# Patient Record
Sex: MALE | Race: WHITE | NOT HISPANIC OR LATINO | Employment: OTHER | URBAN - NONMETROPOLITAN AREA
[De-identification: names, ages, dates, MRNs, and addresses within clinical notes are randomized per-mention and may not be internally consistent; named-entity substitution may affect disease eponyms.]

---

## 2019-01-01 ENCOUNTER — READMISSION MANAGEMENT (OUTPATIENT)
Dept: CALL CENTER | Facility: HOSPITAL | Age: 54
End: 2019-01-01

## 2019-01-01 ENCOUNTER — APPOINTMENT (OUTPATIENT)
Dept: CT IMAGING | Facility: HOSPITAL | Age: 54
End: 2019-01-01

## 2019-01-01 ENCOUNTER — TELEPHONE (OUTPATIENT)
Dept: ONCOLOGY | Facility: CLINIC | Age: 54
End: 2019-01-01

## 2019-01-01 ENCOUNTER — HOSPITAL ENCOUNTER (INPATIENT)
Facility: HOSPITAL | Age: 54
LOS: 2 days | Discharge: HOME | End: 2019-08-16
Attending: EMERGENCY MEDICINE | Admitting: HOSPITALIST

## 2019-01-01 ENCOUNTER — APPOINTMENT (OUTPATIENT)
Dept: MRI IMAGING | Facility: HOSPITAL | Age: 54
End: 2019-01-01

## 2019-01-01 ENCOUNTER — HOSPITAL ENCOUNTER (EMERGENCY)
Facility: HOSPITAL | Age: 54
End: 2019-08-26
Attending: EMERGENCY MEDICINE | Admitting: EMERGENCY MEDICINE

## 2019-01-01 ENCOUNTER — APPOINTMENT (OUTPATIENT)
Dept: NUCLEAR MEDICINE | Facility: HOSPITAL | Age: 54
End: 2019-01-01

## 2019-01-01 VITALS
HEART RATE: 70 BPM | TEMPERATURE: 97 F | BODY MASS INDEX: 37.92 KG/M2 | SYSTOLIC BLOOD PRESSURE: 116 MMHG | WEIGHT: 256 LBS | RESPIRATION RATE: 22 BRPM | HEIGHT: 69 IN | DIASTOLIC BLOOD PRESSURE: 42 MMHG | OXYGEN SATURATION: 93 %

## 2019-01-01 VITALS
RESPIRATION RATE: 16 BRPM | OXYGEN SATURATION: 96 % | TEMPERATURE: 97.6 F | DIASTOLIC BLOOD PRESSURE: 91 MMHG | SYSTOLIC BLOOD PRESSURE: 153 MMHG | HEART RATE: 93 BPM | WEIGHT: 250 LBS | BODY MASS INDEX: 37.03 KG/M2 | HEIGHT: 69 IN

## 2019-01-01 DIAGNOSIS — F11.10 HEROIN ABUSE (HCC): ICD-10-CM

## 2019-01-01 DIAGNOSIS — C22.0 HEPATOCELLULAR CARCINOMA (HCC): Primary | ICD-10-CM

## 2019-01-01 DIAGNOSIS — A41.9 SEPSIS, DUE TO UNSPECIFIED ORGANISM: ICD-10-CM

## 2019-01-01 DIAGNOSIS — E87.5 HYPERKALEMIA: ICD-10-CM

## 2019-01-01 DIAGNOSIS — B17.9 ACUTE HEPATITIS: Primary | ICD-10-CM

## 2019-01-01 DIAGNOSIS — K76.7 HEPATORENAL SYNDROME (HCC): ICD-10-CM

## 2019-01-01 DIAGNOSIS — R17 JAUNDICE: ICD-10-CM

## 2019-01-01 LAB
ALBUMIN SERPL-MCNC: 2.9 G/DL (ref 3.5–5.2)
ALBUMIN SERPL-MCNC: 3.1 G/DL (ref 3.5–5.2)
ALBUMIN SERPL-MCNC: 3.4 G/DL (ref 3.5–5.2)
ALBUMIN/GLOB SERPL: 0.7 G/DL
ALBUMIN/GLOB SERPL: 0.8 G/DL
ALBUMIN/GLOB SERPL: 0.8 G/DL
ALP SERPL-CCNC: 152 U/L (ref 39–117)
ALP SERPL-CCNC: 173 U/L (ref 39–117)
ALP SERPL-CCNC: 217 U/L (ref 39–117)
ALPHA-FETOPROTEIN: ABNORMAL NG/ML (ref 0–8.3)
ALT SERPL W P-5'-P-CCNC: 171 U/L (ref 1–41)
ALT SERPL W P-5'-P-CCNC: 47 U/L (ref 1–41)
ALT SERPL W P-5'-P-CCNC: 54 U/L (ref 1–41)
ANION GAP SERPL CALCULATED.3IONS-SCNC: 15.4 MMOL/L (ref 5–15)
ANION GAP SERPL CALCULATED.3IONS-SCNC: 16.5 MMOL/L (ref 5–15)
ANION GAP SERPL CALCULATED.3IONS-SCNC: 38.6 MMOL/L (ref 5–15)
ANISOCYTOSIS BLD QL: ABNORMAL
APAP SERPL-MCNC: <5 MCG/ML (ref 10–30)
APTT PPP: 70 SECONDS (ref 22.7–35.4)
AST SERPL-CCNC: 148 U/L (ref 1–40)
AST SERPL-CCNC: 172 U/L (ref 1–40)
AST SERPL-CCNC: 697 U/L (ref 1–40)
BACTERIA UR QL AUTO: NORMAL /HPF
BASOPHILS # BLD AUTO: 0.06 10*3/MM3 (ref 0–0.2)
BASOPHILS NFR BLD AUTO: 0.7 % (ref 0–1.5)
BILIRUB SERPL-MCNC: 26 MG/DL (ref 0.2–1.2)
BILIRUB SERPL-MCNC: 9.2 MG/DL (ref 0.2–1.2)
BILIRUB SERPL-MCNC: 9.6 MG/DL (ref 0.2–1.2)
BILIRUB UR QL STRIP: ABNORMAL
BUN BLD-MCNC: 8 MG/DL (ref 6–20)
BUN BLD-MCNC: 8 MG/DL (ref 6–20)
BUN BLD-MCNC: 86 MG/DL (ref 6–20)
BUN/CREAT SERPL: 25.2 (ref 7–25)
BUN/CREAT SERPL: 9.1 (ref 7–25)
BUN/CREAT SERPL: 9.2 (ref 7–25)
CALCIUM SPEC-SCNC: 10 MG/DL (ref 8.6–10.5)
CALCIUM SPEC-SCNC: 9.4 MG/DL (ref 8.6–10.5)
CALCIUM SPEC-SCNC: 9.4 MG/DL (ref 8.6–10.5)
CHLORIDE SERPL-SCNC: 75 MMOL/L (ref 98–107)
CHLORIDE SERPL-SCNC: 92 MMOL/L (ref 98–107)
CHLORIDE SERPL-SCNC: 97 MMOL/L (ref 98–107)
CLARITY UR: CLEAR
CO2 SERPL-SCNC: 21.5 MMOL/L (ref 22–29)
CO2 SERPL-SCNC: 21.6 MMOL/L (ref 22–29)
CO2 SERPL-SCNC: 7.4 MMOL/L (ref 22–29)
COLOR UR: ABNORMAL
CREAT BLD-MCNC: 0.87 MG/DL (ref 0.76–1.27)
CREAT BLD-MCNC: 0.88 MG/DL (ref 0.76–1.27)
CREAT BLD-MCNC: 3.41 MG/DL (ref 0.76–1.27)
CYTO UR: NORMAL
D-LACTATE SERPL-SCNC: 21.1 MMOL/L (ref 0.5–2)
DEPRECATED RDW RBC AUTO: 51.3 FL (ref 37–54)
DEPRECATED RDW RBC AUTO: 63.4 FL (ref 37–54)
EOSINOPHIL # BLD AUTO: 0.13 10*3/MM3 (ref 0–0.4)
EOSINOPHIL # BLD MANUAL: 0.49 10*3/MM3 (ref 0–0.4)
EOSINOPHIL NFR BLD AUTO: 1.6 % (ref 0.3–6.2)
EOSINOPHIL NFR BLD MANUAL: 2 % (ref 0.3–6.2)
ERYTHROCYTE [DISTWIDTH] IN BLOOD BY AUTOMATED COUNT: 19.8 % (ref 12.3–15.4)
ERYTHROCYTE [DISTWIDTH] IN BLOOD BY AUTOMATED COUNT: 24.7 % (ref 12.3–15.4)
ETHANOL BLD-MCNC: <10 MG/DL (ref 0–10)
ETHANOL UR QL: <0.01 %
GFR SERPL CREATININE-BSD FRML MDRD: 19 ML/MIN/1.73
GFR SERPL CREATININE-BSD FRML MDRD: 90 ML/MIN/1.73
GFR SERPL CREATININE-BSD FRML MDRD: 91 ML/MIN/1.73
GLOBULIN UR ELPH-MCNC: 3.8 GM/DL
GLOBULIN UR ELPH-MCNC: 3.9 GM/DL
GLOBULIN UR ELPH-MCNC: 4.1 GM/DL
GLUCOSE BLD-MCNC: 103 MG/DL (ref 65–99)
GLUCOSE BLD-MCNC: 138 MG/DL (ref 65–99)
GLUCOSE BLD-MCNC: 94 MG/DL (ref 65–99)
GLUCOSE BLDC GLUCOMTR-MCNC: 101 MG/DL (ref 70–130)
GLUCOSE BLDC GLUCOMTR-MCNC: 116 MG/DL (ref 70–130)
GLUCOSE BLDC GLUCOMTR-MCNC: 120 MG/DL (ref 70–130)
GLUCOSE BLDC GLUCOMTR-MCNC: 120 MG/DL (ref 70–130)
GLUCOSE BLDC GLUCOMTR-MCNC: 124 MG/DL (ref 70–130)
GLUCOSE BLDC GLUCOMTR-MCNC: 182 MG/DL (ref 70–130)
GLUCOSE BLDC GLUCOMTR-MCNC: 92 MG/DL (ref 70–130)
GLUCOSE UR STRIP-MCNC: NEGATIVE MG/DL
HAV IGM SERPL QL IA: ABNORMAL
HBA1C MFR BLD: 11.43 % (ref 4.8–5.6)
HBV CORE IGM SERPL QL IA: ABNORMAL
HBV SURFACE AG SERPL QL IA: NORMAL
HBV SURFACE AG SERPL QL IA: REACTIVE
HBV SURFACE AG SERPL QL NT: POSITIVE
HCT VFR BLD AUTO: 46.3 % (ref 37.5–51)
HCT VFR BLD AUTO: 50.7 % (ref 37.5–51)
HCV AB SER DONR QL: REACTIVE
HCV RNA SERPL NAA+PROBE-ACNC: NORMAL IU/ML
HGB BLD-MCNC: 14.1 G/DL (ref 13–17.7)
HGB BLD-MCNC: 14.1 G/DL (ref 13–17.7)
HGB UR QL STRIP.AUTO: NEGATIVE
HOLD SPECIMEN: NORMAL
HYALINE CASTS UR QL AUTO: NORMAL /LPF
IMM GRANULOCYTES # BLD AUTO: 0.16 10*3/MM3 (ref 0–0.05)
IMM GRANULOCYTES NFR BLD AUTO: 1.9 % (ref 0–0.5)
INR PPP: 1.16 (ref 0.9–1.1)
INR PPP: 9.44 (ref 0.9–1.1)
KETONES UR QL STRIP: NEGATIVE
LAB AP CASE REPORT: NORMAL
LAB AP CLINICAL INFORMATION: NORMAL
LAB AP DIAGNOSIS COMMENT: NORMAL
LEUKOCYTE ESTERASE UR QL STRIP.AUTO: ABNORMAL
LIPASE SERPL-CCNC: 8 U/L (ref 13–60)
LYMPHOCYTES # BLD AUTO: 1.41 10*3/MM3 (ref 0.7–3.1)
LYMPHOCYTES # BLD MANUAL: 3.68 10*3/MM3 (ref 0.7–3.1)
LYMPHOCYTES NFR BLD AUTO: 16.8 % (ref 19.6–45.3)
LYMPHOCYTES NFR BLD MANUAL: 15 % (ref 19.6–45.3)
LYMPHOCYTES NFR BLD MANUAL: 6 % (ref 5–12)
MAGNESIUM SERPL-MCNC: 4.2 MG/DL (ref 1.6–2.6)
MCH RBC QN AUTO: 22.1 PG (ref 26.6–33)
MCH RBC QN AUTO: 24.1 PG (ref 26.6–33)
MCHC RBC AUTO-ENTMCNC: 27.8 G/DL (ref 31.5–35.7)
MCHC RBC AUTO-ENTMCNC: 30.5 G/DL (ref 31.5–35.7)
MCV RBC AUTO: 79.3 FL (ref 79–97)
MCV RBC AUTO: 79.3 FL (ref 79–97)
METAMYELOCYTES NFR BLD MANUAL: 7 % (ref 0–0)
MONOCYTES # BLD AUTO: 0.72 10*3/MM3 (ref 0.1–0.9)
MONOCYTES # BLD AUTO: 1.47 10*3/MM3 (ref 0.1–0.9)
MONOCYTES NFR BLD AUTO: 8.6 % (ref 5–12)
MYELOCYTES NFR BLD MANUAL: 3 % (ref 0–0)
NEUTROPHILS # BLD AUTO: 16.44 10*3/MM3 (ref 1.7–7)
NEUTROPHILS # BLD AUTO: 5.9 10*3/MM3 (ref 1.7–7)
NEUTROPHILS NFR BLD AUTO: 70.4 % (ref 42.7–76)
NEUTROPHILS NFR BLD MANUAL: 67 % (ref 42.7–76)
NITRITE UR QL STRIP: POSITIVE
NRBC BLD AUTO-RTO: 0.7 /100 WBC (ref 0–0.2)
NRBC BLD AUTO-RTO: 5.6 /100 WBC (ref 0–0.2)
NRBC SPEC MANUAL: 11 /100 WBC (ref 0–0.2)
PATH REPORT.FINAL DX SPEC: NORMAL
PATH REPORT.GROSS SPEC: NORMAL
PH UR STRIP.AUTO: 5.5 [PH] (ref 5–8)
PLAT MORPH BLD: NORMAL
PLATELET # BLD AUTO: 327 10*3/MM3 (ref 140–450)
PLATELET # BLD AUTO: 364 10*3/MM3 (ref 140–450)
PMV BLD AUTO: 12.2 FL (ref 6–12)
POTASSIUM BLD-SCNC: 3.9 MMOL/L (ref 3.5–5.2)
POTASSIUM BLD-SCNC: 4.2 MMOL/L (ref 3.5–5.2)
POTASSIUM BLD-SCNC: 7.1 MMOL/L (ref 3.5–5.2)
PROT SERPL-MCNC: 6.8 G/DL (ref 6–8.5)
PROT SERPL-MCNC: 6.9 G/DL (ref 6–8.5)
PROT SERPL-MCNC: 7.5 G/DL (ref 6–8.5)
PROT UR QL STRIP: ABNORMAL
PROTHROMBIN TIME: 14.5 SECONDS (ref 11.7–14.2)
PROTHROMBIN TIME: 76.7 SECONDS (ref 11.7–14.2)
RBC # BLD AUTO: 5.84 10*6/MM3 (ref 4.14–5.8)
RBC # BLD AUTO: 6.39 10*6/MM3 (ref 4.14–5.8)
RBC # UR: NORMAL /HPF
REF LAB TEST METHOD: NORMAL
SCHISTOCYTES BLD QL SMEAR: ABNORMAL
SODIUM BLD-SCNC: 121 MMOL/L (ref 136–145)
SODIUM BLD-SCNC: 130 MMOL/L (ref 136–145)
SODIUM BLD-SCNC: 134 MMOL/L (ref 136–145)
SP GR UR STRIP: 1.02 (ref 1–1.03)
SQUAMOUS #/AREA URNS HPF: NORMAL /HPF
TARGETS BLD QL SMEAR: ABNORMAL
TEST INFORMATION: NORMAL
UROBILINOGEN UR QL STRIP: ABNORMAL
WBC MORPH BLD: NORMAL
WBC NRBC COR # BLD: 24.54 10*3/MM3 (ref 3.4–10.8)
WBC NRBC COR # BLD: 8.38 10*3/MM3 (ref 3.4–10.8)
WBC UR QL AUTO: NORMAL /HPF
WHOLE BLOOD HOLD SPECIMEN: NORMAL
WHOLE BLOOD HOLD SPECIMEN: NORMAL

## 2019-01-01 PROCEDURE — 82105 ALPHA-FETOPROTEIN SERUM: CPT | Performed by: NURSE PRACTITIONER

## 2019-01-01 PROCEDURE — 82962 GLUCOSE BLOOD TEST: CPT

## 2019-01-01 PROCEDURE — 85610 PROTHROMBIN TIME: CPT | Performed by: EMERGENCY MEDICINE

## 2019-01-01 PROCEDURE — 25010000003 LIDOCAINE 1 % SOLUTION: Performed by: RADIOLOGY

## 2019-01-01 PROCEDURE — A9577 INJ MULTIHANCE: HCPCS | Performed by: HOSPITALIST

## 2019-01-01 PROCEDURE — 99284 EMERGENCY DEPT VISIT MOD MDM: CPT

## 2019-01-01 PROCEDURE — 85025 COMPLETE CBC W/AUTO DIFF WBC: CPT | Performed by: EMERGENCY MEDICINE

## 2019-01-01 PROCEDURE — 99233 SBSQ HOSP IP/OBS HIGH 50: CPT | Performed by: INTERNAL MEDICINE

## 2019-01-01 PROCEDURE — 99285 EMERGENCY DEPT VISIT HI MDM: CPT

## 2019-01-01 PROCEDURE — 87522 HEPATITIS C REVRS TRNSCRPJ: CPT | Performed by: INTERNAL MEDICINE

## 2019-01-01 PROCEDURE — 96375 TX/PRO/DX INJ NEW DRUG ADDON: CPT

## 2019-01-01 PROCEDURE — 88307 TISSUE EXAM BY PATHOLOGIST: CPT | Performed by: INTERNAL MEDICINE

## 2019-01-01 PROCEDURE — 25010000002 ONDANSETRON PER 1 MG: Performed by: EMERGENCY MEDICINE

## 2019-01-01 PROCEDURE — 25010000002 IOPAMIDOL 61 % SOLUTION: Performed by: EMERGENCY MEDICINE

## 2019-01-01 PROCEDURE — 80053 COMPREHEN METABOLIC PANEL: CPT | Performed by: NURSE PRACTITIONER

## 2019-01-01 PROCEDURE — 80307 DRUG TEST PRSMV CHEM ANLYZR: CPT | Performed by: EMERGENCY MEDICINE

## 2019-01-01 PROCEDURE — 83036 HEMOGLOBIN GLYCOSYLATED A1C: CPT | Performed by: NURSE PRACTITIONER

## 2019-01-01 PROCEDURE — 80053 COMPREHEN METABOLIC PANEL: CPT | Performed by: EMERGENCY MEDICINE

## 2019-01-01 PROCEDURE — 25010000002 HYDROMORPHONE PER 4 MG: Performed by: HOSPITALIST

## 2019-01-01 PROCEDURE — 96361 HYDRATE IV INFUSION ADD-ON: CPT

## 2019-01-01 PROCEDURE — 0 GADOBENATE DIMEGLUMINE 529 MG/ML SOLUTION: Performed by: HOSPITALIST

## 2019-01-01 PROCEDURE — 0 TECHNETIUM MEDRONATE KIT: Performed by: INTERNAL MEDICINE

## 2019-01-01 PROCEDURE — 83690 ASSAY OF LIPASE: CPT | Performed by: EMERGENCY MEDICINE

## 2019-01-01 PROCEDURE — 85730 THROMBOPLASTIN TIME PARTIAL: CPT | Performed by: EMERGENCY MEDICINE

## 2019-01-01 PROCEDURE — 25010000002 PIPERACILLIN SOD-TAZOBACTAM PER 1 G: Performed by: EMERGENCY MEDICINE

## 2019-01-01 PROCEDURE — 80074 ACUTE HEPATITIS PANEL: CPT | Performed by: EMERGENCY MEDICINE

## 2019-01-01 PROCEDURE — 88313 SPECIAL STAINS GROUP 2: CPT | Performed by: INTERNAL MEDICINE

## 2019-01-01 PROCEDURE — 0FB03ZX EXCISION OF LIVER, PERCUTANEOUS APPROACH, DIAGNOSTIC: ICD-10-PCS | Performed by: RADIOLOGY

## 2019-01-01 PROCEDURE — 83735 ASSAY OF MAGNESIUM: CPT | Performed by: EMERGENCY MEDICINE

## 2019-01-01 PROCEDURE — 25010000002 LORAZEPAM PER 2 MG: Performed by: EMERGENCY MEDICINE

## 2019-01-01 PROCEDURE — 99255 IP/OBS CONSLTJ NEW/EST HI 80: CPT | Performed by: INTERNAL MEDICINE

## 2019-01-01 PROCEDURE — A9503 TC99M MEDRONATE: HCPCS | Performed by: INTERNAL MEDICINE

## 2019-01-01 PROCEDURE — 77012 CT SCAN FOR NEEDLE BIOPSY: CPT

## 2019-01-01 PROCEDURE — 25010000002 HYDROMORPHONE PER 4 MG: Performed by: NURSE PRACTITIONER

## 2019-01-01 PROCEDURE — 25010000002 HYDROMORPHONE PER 4 MG: Performed by: EMERGENCY MEDICINE

## 2019-01-01 PROCEDURE — 71250 CT THORAX DX C-: CPT

## 2019-01-01 PROCEDURE — 85007 BL SMEAR W/DIFF WBC COUNT: CPT | Performed by: EMERGENCY MEDICINE

## 2019-01-01 PROCEDURE — 74177 CT ABD & PELVIS W/CONTRAST: CPT

## 2019-01-01 PROCEDURE — 96365 THER/PROPH/DIAG IV INF INIT: CPT

## 2019-01-01 PROCEDURE — 88342 IMHCHEM/IMCYTCHM 1ST ANTB: CPT | Performed by: INTERNAL MEDICINE

## 2019-01-01 PROCEDURE — 25010000002 HYDROMORPHONE PER 4 MG: Performed by: INTERNAL MEDICINE

## 2019-01-01 PROCEDURE — 83605 ASSAY OF LACTIC ACID: CPT | Performed by: EMERGENCY MEDICINE

## 2019-01-01 PROCEDURE — 63710000001 INSULIN GLARGINE PER 5 UNITS: Performed by: NURSE PRACTITIONER

## 2019-01-01 PROCEDURE — 63710000001 INSULIN LISPRO (HUMAN) PER 5 UNITS: Performed by: NURSE PRACTITIONER

## 2019-01-01 PROCEDURE — 74183 MRI ABD W/O CNTR FLWD CNTR: CPT

## 2019-01-01 PROCEDURE — 87341 HEP B SURFACE AG NEUTRLZJ IA: CPT | Performed by: NURSE PRACTITIONER

## 2019-01-01 PROCEDURE — 81001 URINALYSIS AUTO W/SCOPE: CPT | Performed by: EMERGENCY MEDICINE

## 2019-01-01 PROCEDURE — 88341 IMHCHEM/IMCYTCHM EA ADD ANTB: CPT | Performed by: INTERNAL MEDICINE

## 2019-01-01 RX ORDER — HYDROMORPHONE HYDROCHLORIDE 1 MG/ML
0.5 INJECTION, SOLUTION INTRAMUSCULAR; INTRAVENOUS; SUBCUTANEOUS
Status: DISCONTINUED | OUTPATIENT
Start: 2019-01-01 | End: 2019-01-01

## 2019-01-01 RX ORDER — SODIUM CHLORIDE 0.9 % (FLUSH) 0.9 %
10 SYRINGE (ML) INJECTION EVERY 12 HOURS SCHEDULED
Status: CANCELLED | OUTPATIENT
Start: 2019-01-01

## 2019-01-01 RX ORDER — OXYCODONE HYDROCHLORIDE 15 MG/1
30 TABLET ORAL EVERY 6 HOURS PRN
Status: DISCONTINUED | OUTPATIENT
Start: 2019-01-01 | End: 2019-01-01 | Stop reason: HOSPADM

## 2019-01-01 RX ORDER — LISINOPRIL 20 MG/1
20 TABLET ORAL DAILY
Status: DISCONTINUED | OUTPATIENT
Start: 2019-01-01 | End: 2019-01-01 | Stop reason: HOSPADM

## 2019-01-01 RX ORDER — CHOLECALCIFEROL (VITAMIN D3) 125 MCG
5 CAPSULE ORAL NIGHTLY PRN
Status: DISCONTINUED | OUTPATIENT
Start: 2019-01-01 | End: 2019-01-01 | Stop reason: HOSPADM

## 2019-01-01 RX ORDER — ONDANSETRON 2 MG/ML
4 INJECTION INTRAMUSCULAR; INTRAVENOUS ONCE
Status: COMPLETED | OUTPATIENT
Start: 2019-01-01 | End: 2019-01-01

## 2019-01-01 RX ORDER — OXYCODONE HYDROCHLORIDE 15 MG/1
30 TABLET ORAL EVERY 6 HOURS PRN
Qty: 240 TABLET | Refills: 0 | Status: CANCELLED | OUTPATIENT
Start: 2019-01-01

## 2019-01-01 RX ORDER — SODIUM CHLORIDE 0.9 % (FLUSH) 0.9 %
3 SYRINGE (ML) INJECTION EVERY 12 HOURS SCHEDULED
Status: DISCONTINUED | OUTPATIENT
Start: 2019-01-01 | End: 2019-01-01 | Stop reason: HOSPADM

## 2019-01-01 RX ORDER — LISINOPRIL 20 MG/1
20 TABLET ORAL DAILY
COMMUNITY

## 2019-01-01 RX ORDER — OXYCODONE HYDROCHLORIDE 15 MG/1
30 TABLET ORAL EVERY 6 HOURS PRN
Qty: 240 TABLET | Refills: 0
Start: 2019-01-01 | End: 2019-01-01

## 2019-01-01 RX ORDER — METAXALONE 800 MG/1
800 TABLET ORAL 3 TIMES DAILY PRN
Status: DISCONTINUED | OUTPATIENT
Start: 2019-01-01 | End: 2019-01-01 | Stop reason: HOSPADM

## 2019-01-01 RX ORDER — LIDOCAINE HYDROCHLORIDE 10 MG/ML
20 INJECTION, SOLUTION INFILTRATION; PERINEURAL ONCE
Status: COMPLETED | OUTPATIENT
Start: 2019-01-01 | End: 2019-01-01

## 2019-01-01 RX ORDER — SODIUM CHLORIDE 0.9 % (FLUSH) 0.9 %
3-10 SYRINGE (ML) INJECTION AS NEEDED
Status: DISCONTINUED | OUTPATIENT
Start: 2019-01-01 | End: 2019-01-01 | Stop reason: HOSPADM

## 2019-01-01 RX ORDER — HYDROMORPHONE HYDROCHLORIDE 1 MG/ML
0.5 INJECTION, SOLUTION INTRAMUSCULAR; INTRAVENOUS; SUBCUTANEOUS ONCE
Status: COMPLETED | OUTPATIENT
Start: 2019-01-01 | End: 2019-01-01

## 2019-01-01 RX ORDER — INSULIN GLARGINE 100 [IU]/ML
10 INJECTION, SOLUTION SUBCUTANEOUS NIGHTLY
Status: DISCONTINUED | OUTPATIENT
Start: 2019-01-01 | End: 2019-01-01 | Stop reason: HOSPADM

## 2019-01-01 RX ORDER — SODIUM CHLORIDE 0.9 % (FLUSH) 0.9 %
20 SYRINGE (ML) INJECTION AS NEEDED
Status: CANCELLED | OUTPATIENT
Start: 2019-01-01

## 2019-01-01 RX ORDER — HYDROMORPHONE HYDROCHLORIDE 1 MG/ML
1 INJECTION, SOLUTION INTRAMUSCULAR; INTRAVENOUS; SUBCUTANEOUS
Status: DISCONTINUED | OUTPATIENT
Start: 2019-01-01 | End: 2019-01-01

## 2019-01-01 RX ORDER — HYDROCODONE BITARTRATE AND ACETAMINOPHEN 5; 325 MG/1; MG/1
1 TABLET ORAL ONCE AS NEEDED
Status: COMPLETED | OUTPATIENT
Start: 2019-01-01 | End: 2019-01-01

## 2019-01-01 RX ORDER — SODIUM CHLORIDE 0.9 % (FLUSH) 0.9 %
10 SYRINGE (ML) INJECTION AS NEEDED
Status: CANCELLED | OUTPATIENT
Start: 2019-01-01

## 2019-01-01 RX ORDER — HYDROMORPHONE HCL IN 0.9% NACL 10 MG/50ML
PATIENT CONTROLLED ANALGESIA SYRINGE INTRAVENOUS CONTINUOUS
Status: DISCONTINUED | OUTPATIENT
Start: 2019-01-01 | End: 2019-01-01

## 2019-01-01 RX ORDER — NALOXONE HCL 0.4 MG/ML
0.1 VIAL (ML) INJECTION
Status: DISCONTINUED | OUTPATIENT
Start: 2019-01-01 | End: 2019-01-01 | Stop reason: HOSPADM

## 2019-01-01 RX ORDER — SODIUM CHLORIDE 0.9 % (FLUSH) 0.9 %
10 SYRINGE (ML) INJECTION AS NEEDED
Status: DISCONTINUED | OUTPATIENT
Start: 2019-01-01 | End: 2019-01-01 | Stop reason: HOSPADM

## 2019-01-01 RX ORDER — ALUMINA, MAGNESIA, AND SIMETHICONE 2400; 2400; 240 MG/30ML; MG/30ML; MG/30ML
15 SUSPENSION ORAL EVERY 6 HOURS PRN
Status: DISCONTINUED | OUTPATIENT
Start: 2019-01-01 | End: 2019-01-01 | Stop reason: HOSPADM

## 2019-01-01 RX ORDER — DEXTROSE MONOHYDRATE 25 G/50ML
25 INJECTION, SOLUTION INTRAVENOUS
Status: DISCONTINUED | OUTPATIENT
Start: 2019-01-01 | End: 2019-01-01 | Stop reason: HOSPADM

## 2019-01-01 RX ORDER — OXYCODONE HYDROCHLORIDE 15 MG/1
30 TABLET ORAL EVERY 6 HOURS PRN
Qty: 120 TABLET | Refills: 0 | Status: SHIPPED | OUTPATIENT
Start: 2019-01-01

## 2019-01-01 RX ORDER — ONDANSETRON 2 MG/ML
4 INJECTION INTRAMUSCULAR; INTRAVENOUS EVERY 6 HOURS PRN
Status: DISCONTINUED | OUTPATIENT
Start: 2019-01-01 | End: 2019-01-01 | Stop reason: HOSPADM

## 2019-01-01 RX ORDER — HYDROMORPHONE HYDROCHLORIDE 1 MG/ML
2 INJECTION, SOLUTION INTRAMUSCULAR; INTRAVENOUS; SUBCUTANEOUS
Status: DISCONTINUED | OUTPATIENT
Start: 2019-01-01 | End: 2019-01-01

## 2019-01-01 RX ORDER — NICOTINE POLACRILEX 4 MG
15 LOZENGE BUCCAL
Status: DISCONTINUED | OUTPATIENT
Start: 2019-01-01 | End: 2019-01-01 | Stop reason: HOSPADM

## 2019-01-01 RX ORDER — TC 99M MEDRONATE 20 MG/10ML
21.2 INJECTION, POWDER, LYOPHILIZED, FOR SOLUTION INTRAVENOUS
Status: COMPLETED | OUTPATIENT
Start: 2019-01-01 | End: 2019-01-01

## 2019-01-01 RX ORDER — HYDROMORPHONE HCL IN 0.9% NACL 10 MG/50ML
PATIENT CONTROLLED ANALGESIA SYRINGE INTRAVENOUS CONTINUOUS
Status: DISCONTINUED | OUTPATIENT
Start: 2019-01-01 | End: 2019-01-01 | Stop reason: HOSPADM

## 2019-01-01 RX ORDER — SODIUM CHLORIDE 9 MG/ML
125 INJECTION, SOLUTION INTRAVENOUS CONTINUOUS
Status: DISCONTINUED | OUTPATIENT
Start: 2019-01-01 | End: 2019-01-01 | Stop reason: HOSPADM

## 2019-01-01 RX ORDER — LORAZEPAM 2 MG/ML
1 INJECTION INTRAMUSCULAR ONCE
Status: COMPLETED | OUTPATIENT
Start: 2019-01-01 | End: 2019-01-01

## 2019-01-01 RX ORDER — LIDOCAINE 50 MG/G
2 PATCH TOPICAL
Status: DISCONTINUED | OUTPATIENT
Start: 2019-01-01 | End: 2019-01-01 | Stop reason: HOSPADM

## 2019-01-01 RX ADMIN — METAXALONE 800 MG: 800 TABLET ORAL at 05:31

## 2019-01-01 RX ADMIN — HYDROMORPHONE HYDROCHLORIDE 1 MG: 1 INJECTION, SOLUTION INTRAMUSCULAR; INTRAVENOUS; SUBCUTANEOUS at 23:19

## 2019-01-01 RX ADMIN — HYDROMORPHONE HYDROCHLORIDE 1 MG: 1 INJECTION, SOLUTION INTRAMUSCULAR; INTRAVENOUS; SUBCUTANEOUS at 20:19

## 2019-01-01 RX ADMIN — IOPAMIDOL 85 ML: 612 INJECTION, SOLUTION INTRAVENOUS at 09:29

## 2019-01-01 RX ADMIN — SODIUM CHLORIDE, PRESERVATIVE FREE 3 ML: 5 INJECTION INTRAVENOUS at 21:30

## 2019-01-01 RX ADMIN — LIDOCAINE 2 PATCH: 50 PATCH TOPICAL at 08:32

## 2019-01-01 RX ADMIN — HYDROMORPHONE HYDROCHLORIDE 0.5 MG: 1 INJECTION, SOLUTION INTRAMUSCULAR; INTRAVENOUS; SUBCUTANEOUS at 13:28

## 2019-01-01 RX ADMIN — ONDANSETRON 4 MG: 2 INJECTION INTRAMUSCULAR; INTRAVENOUS at 13:28

## 2019-01-01 RX ADMIN — HYDROMORPHONE HYDROCHLORIDE: 10 INJECTION INTRAMUSCULAR; INTRAVENOUS; SUBCUTANEOUS at 08:53

## 2019-01-01 RX ADMIN — HYDROMORPHONE HYDROCHLORIDE: 10 INJECTION INTRAMUSCULAR; INTRAVENOUS; SUBCUTANEOUS at 22:10

## 2019-01-01 RX ADMIN — TAZOBACTAM SODIUM AND PIPERACILLIN SODIUM 4.5 G: 500; 4 INJECTION, SOLUTION INTRAVENOUS at 13:54

## 2019-01-01 RX ADMIN — LISINOPRIL 20 MG: 20 TABLET ORAL at 08:20

## 2019-01-01 RX ADMIN — HYDROMORPHONE HYDROCHLORIDE 0.5 MG: 1 INJECTION, SOLUTION INTRAMUSCULAR; INTRAVENOUS; SUBCUTANEOUS at 11:46

## 2019-01-01 RX ADMIN — HYDROMORPHONE HYDROCHLORIDE 1 MG: 1 INJECTION, SOLUTION INTRAMUSCULAR; INTRAVENOUS; SUBCUTANEOUS at 02:35

## 2019-01-01 RX ADMIN — GADOBENATE DIMEGLUMINE 20 ML: 529 INJECTION, SOLUTION INTRAVENOUS at 15:23

## 2019-01-01 RX ADMIN — HYDROMORPHONE HYDROCHLORIDE 0.5 MG: 1 INJECTION, SOLUTION INTRAMUSCULAR; INTRAVENOUS; SUBCUTANEOUS at 17:39

## 2019-01-01 RX ADMIN — LORAZEPAM 1 MG: 2 INJECTION INTRAMUSCULAR; INTRAVENOUS at 13:39

## 2019-01-01 RX ADMIN — SODIUM CHLORIDE, PRESERVATIVE FREE 3 ML: 5 INJECTION INTRAVENOUS at 08:00

## 2019-01-01 RX ADMIN — LIDOCAINE HYDROCHLORIDE 20 ML: 10 INJECTION, SOLUTION INFILTRATION; PERINEURAL at 11:15

## 2019-01-01 RX ADMIN — METAXALONE 800 MG: 800 TABLET ORAL at 13:30

## 2019-01-01 RX ADMIN — HYDROMORPHONE HYDROCHLORIDE 0.5 MG: 1 INJECTION, SOLUTION INTRAMUSCULAR; INTRAVENOUS; SUBCUTANEOUS at 13:39

## 2019-01-01 RX ADMIN — INSULIN LISPRO 2 UNITS: 100 INJECTION, SOLUTION INTRAVENOUS; SUBCUTANEOUS at 17:39

## 2019-01-01 RX ADMIN — SODIUM CHLORIDE, PRESERVATIVE FREE 3 ML: 5 INJECTION INTRAVENOUS at 22:27

## 2019-01-01 RX ADMIN — HYDROMORPHONE HYDROCHLORIDE 1 MG: 1 INJECTION, SOLUTION INTRAMUSCULAR; INTRAVENOUS; SUBCUTANEOUS at 05:37

## 2019-01-01 RX ADMIN — METAXALONE 800 MG: 800 TABLET ORAL at 21:37

## 2019-01-01 RX ADMIN — SODIUM CHLORIDE, PRESERVATIVE FREE 3 ML: 5 INJECTION INTRAVENOUS at 08:33

## 2019-01-01 RX ADMIN — ALUMINUM HYDROXIDE, MAGNESIUM HYDROXIDE, AND DIMETHICONE 15 ML: 400; 400; 40 SUSPENSION ORAL at 16:41

## 2019-01-01 RX ADMIN — HYDROMORPHONE HYDROCHLORIDE 2 MG: 1 INJECTION, SOLUTION INTRAMUSCULAR; INTRAVENOUS; SUBCUTANEOUS at 07:58

## 2019-01-01 RX ADMIN — ONDANSETRON 4 MG: 2 INJECTION INTRAMUSCULAR; INTRAVENOUS at 11:46

## 2019-01-01 RX ADMIN — SODIUM CHLORIDE 125 ML/HR: 9 INJECTION, SOLUTION INTRAVENOUS at 13:24

## 2019-01-01 RX ADMIN — SODIUM CHLORIDE 1000 ML: 9 INJECTION, SOLUTION INTRAVENOUS at 08:27

## 2019-01-01 RX ADMIN — HYDROCODONE BITARTRATE AND ACETAMINOPHEN 1 TABLET: 5; 325 TABLET ORAL at 05:31

## 2019-01-01 RX ADMIN — LISINOPRIL 20 MG: 20 TABLET ORAL at 08:32

## 2019-01-01 RX ADMIN — Medication 21.2 MILLICURIE: at 06:30

## 2019-01-01 RX ADMIN — HYDROMORPHONE HYDROCHLORIDE 0.5 MG: 1 INJECTION, SOLUTION INTRAMUSCULAR; INTRAVENOUS; SUBCUTANEOUS at 14:22

## 2019-01-01 RX ADMIN — OXYCODONE HYDROCHLORIDE 30 MG: 15 TABLET ORAL at 11:40

## 2019-01-01 RX ADMIN — LIDOCAINE 2 PATCH: 50 PATCH TOPICAL at 13:30

## 2019-01-01 RX ADMIN — INSULIN GLARGINE 10 UNITS: 100 INJECTION, SOLUTION SUBCUTANEOUS at 21:18

## 2019-08-14 PROBLEM — B19.20 HEPATITIS C: Status: ACTIVE | Noted: 2019-01-01

## 2019-08-14 PROBLEM — B17.9 ACUTE HEPATITIS: Status: ACTIVE | Noted: 2019-01-01

## 2019-08-14 PROBLEM — I10 HYPERTENSION: Status: ACTIVE | Noted: 2019-01-01

## 2019-08-14 PROBLEM — E11.9 DIABETES MELLITUS (HCC): Status: ACTIVE | Noted: 2019-01-01

## 2019-08-14 PROBLEM — B19.10 HEPATITIS B: Status: ACTIVE | Noted: 2019-01-01

## 2019-08-14 NOTE — ED PROVIDER NOTES
" EMERGENCY DEPARTMENT ENCOUNTER    CHIEF COMPLAINT  Chief Complaint: Abdominal Pain   History given by: Patient   History limited by: none   Room Number: 02/02  PMD: German Monahan MD      HPI:  Pt is a 54 y.o. male who presents complaining of epigastric and RUQ abd pain for the past 2 weeks. Pt confirms jaundice over the last few days, as well as nausea, vomiting, \"yellow\" diarrhea, and increased concentration of urine. Per pt, he has not seen or discussed case with primary care. Pt states he has hx of hepatitis B and C, which he was told was resolved with medications. Pt affirms he was diagnosed with \"mass\" in abd 6 months ago at [facility], but is unable to specify any further details. Pt denies hx of abdominal surgeries, hx of EtOH usage, hx of smoking, or any overuse of Tylenol.     Duration:  2 weeks   Onset: gradual   Timing: constant  Location: epigastric and RUQ  Radiation: none   Quality: pain   Intensity/Severity: moderate   Progression: unchanged   Associated Symptoms: jaundice, nausea, vomiting, diarrhea, concentrated urine   Aggravating Factors: none   Alleviating Factors: none   Previous Episodes: none  Treatment before arrival: none     PAST MEDICAL HISTORY  Active Ambulatory Problems     Diagnosis Date Noted   • No Active Ambulatory Problems     Resolved Ambulatory Problems     Diagnosis Date Noted   • No Resolved Ambulatory Problems     Past Medical History:   Diagnosis Date   • Diabetes mellitus (CMS/HCC)    • Hepatitis B    • Hepatitis C    • Hyperlipidemia    • Hypertension    • Myocardial infarction (CMS/HCC) 2014   • Substance abuse (CMS/HCC)        PAST SURGICAL HISTORY  Past Surgical History:   Procedure Laterality Date   • CORONARY ANGIOPLASTY WITH STENT PLACEMENT         FAMILY HISTORY  History reviewed. No pertinent family history.    SOCIAL HISTORY  Social History     Socioeconomic History   • Marital status:      Spouse name: Not on file   • Number of children: Not on file   • " "Years of education: Not on file   • Highest education level: Not on file   Tobacco Use   • Smoking status: Never Smoker   Substance and Sexual Activity   • Alcohol use: No     Frequency: Never   • Drug use: No       ALLERGIES  Sulfa antibiotics    REVIEW OF SYSTEMS  Review of Systems   Constitutional: Negative for activity change, appetite change and fever.   HENT: Negative for congestion and sore throat.    Eyes: Negative.    Respiratory: Negative for cough and shortness of breath.    Cardiovascular: Negative for chest pain and leg swelling.   Gastrointestinal: Positive for abdominal pain (epigastric and RUQ), diarrhea (\"yellow\"), nausea and vomiting.   Endocrine: Negative.    Genitourinary: Negative for decreased urine volume and dysuria.        (+) concentrated urine   Musculoskeletal: Negative for neck pain.   Skin: Positive for color change (jaundice). Negative for rash and wound.   Allergic/Immunologic: Negative.    Neurological: Negative for weakness, numbness and headaches.   Hematological: Negative.    Psychiatric/Behavioral: Negative.    All other systems reviewed and are negative.      PHYSICAL EXAM  ED Triage Vitals   Temp Heart Rate Resp BP SpO2   08/14/19 0808 08/14/19 0808 08/14/19 0808 08/14/19 0814 08/14/19 0808   98.1 °F (36.7 °C) 111 18 (!) 167/109 97 %      Temp src Heart Rate Source Patient Position BP Location FiO2 (%)   08/14/19 0808 08/14/19 0808 -- -- --   Tympanic Monitor          Physical Exam   Constitutional: He is oriented to person, place, and time. He appears jaundiced. No distress.   HENT:   Head: Normocephalic and atraumatic.   Eyes: EOM are normal. Pupils are equal, round, and reactive to light. Scleral icterus is present.   Neck: Normal range of motion. Neck supple.   Cardiovascular: Normal rate, regular rhythm and normal heart sounds.   Pulmonary/Chest: Effort normal and breath sounds normal. No respiratory distress.   Abdominal: Soft. He exhibits distension. There is " hepatomegaly. There is tenderness in the right upper quadrant and epigastric area. There is no rebound and no guarding.   Musculoskeletal: Normal range of motion. He exhibits no edema.   Neurological: He is alert and oriented to person, place, and time. He has normal sensation and normal strength.   Skin: Skin is warm and dry.   Psychiatric: Mood and affect normal.   Nursing note and vitals reviewed.      LAB RESULTS  Lab Results (last 24 hours)     Procedure Component Value Units Date/Time    CBC & Differential [[id number]] Collected:  08/14/19 0819    Specimen:  Blood Updated:  08/14/19 0839    Narrative:       The following orders were created for panel order CBC & Differential.  Procedure                               Abnormality         Status                     ---------                               -----------         ------                     CBC Auto Differential[[id number]]        Abnormal            Final result                 Please view results for these tests on the individual orders.    Comprehensive Metabolic Panel [[id number]]  (Abnormal) Collected:  08/14/19 0819    Specimen:  Blood Updated:  08/14/19 0909     Glucose 138 mg/dL      BUN 8 mg/dL      Creatinine 0.88 mg/dL      Sodium 130 mmol/L      Potassium 4.2 mmol/L      Chloride 92 mmol/L      CO2 21.5 mmol/L      Calcium 9.4 mg/dL      Total Protein 7.5 g/dL      Albumin 3.40 g/dL      ALT (SGPT) 54 U/L      AST (SGOT) 172 U/L      Alkaline Phosphatase 173 U/L      Total Bilirubin 9.6 mg/dL      eGFR Non African Amer 90 mL/min/1.73      Globulin 4.1 gm/dL      A/G Ratio 0.8 g/dL      BUN/Creatinine Ratio 9.1     Anion Gap 16.5 mmol/L     Narrative:       GFR Normal >60  Chronic Kidney Disease <60  Kidney Failure <15    Protime-INR [[id number]]  (Abnormal) Collected:  08/14/19 0819    Specimen:  Blood Updated:  08/14/19 0850     Protime 14.5 Seconds      INR 1.16    Lipase [[id number]]  (Abnormal) Collected:  08/14/19 0819    Specimen:  Blood  Updated:  08/14/19 0906     Lipase 8 U/L     Ethanol [[id number]] Collected:  08/14/19 0819    Specimen:  Blood Updated:  08/14/19 0906     Ethanol <10 mg/dL      Ethanol % <0.010 %     Acetaminophen Level [[id number]]  (Abnormal) Collected:  08/14/19 0819    Specimen:  Blood Updated:  08/14/19 0906     Acetaminophen <5.0 mcg/mL     Hepatitis Panel, Acute [[id number]] Collected:  08/14/19 0819    Specimen:  Blood Updated:  08/14/19 0834    CBC Auto Differential [[id number]]  (Abnormal) Collected:  08/14/19 0819    Specimen:  Blood Updated:  08/14/19 0839     WBC 8.38 10*3/mm3      RBC 5.84 10*6/mm3      Hemoglobin 14.1 g/dL      Hematocrit 46.3 %      MCV 79.3 fL      MCH 24.1 pg      MCHC 30.5 g/dL      RDW 19.8 %      RDW-SD 51.3 fl      MPV 12.2 fL      Platelets 364 10*3/mm3      Neutrophil % 70.4 %      Lymphocyte % 16.8 %      Monocyte % 8.6 %      Eosinophil % 1.6 %      Basophil % 0.7 %      Immature Grans % 1.9 %      Neutrophils, Absolute 5.90 10*3/mm3      Lymphocytes, Absolute 1.41 10*3/mm3      Monocytes, Absolute 0.72 10*3/mm3      Eosinophils, Absolute 0.13 10*3/mm3      Basophils, Absolute 0.06 10*3/mm3      Immature Grans, Absolute 0.16 10*3/mm3      nRBC 0.7 /100 WBC     Urinalysis With Microscopic If Indicated (No Culture) - Urine, Clean Catch [[id number]]  (Abnormal) Collected:  08/14/19 0844    Specimen:  Urine, Clean Catch Updated:  08/14/19 0908     Color, UA Dark Yellow     Appearance, UA Clear     pH, UA 5.5     Specific Gravity, UA 1.024     Glucose, UA Negative     Ketones, UA Negative     Bilirubin, UA Large (3+)     Blood, UA Negative     Protein, UA 30 mg/dL (1+)     Leuk Esterase, UA Trace     Nitrite, UA Positive     Urobilinogen, UA 1.0 E.U./dL    Urinalysis, Microscopic Only - Urine, Clean Catch [[id number]] Collected:  08/14/19 0844    Specimen:  Urine, Clean Catch Updated:  08/14/19 0908     RBC, UA 0-2 /HPF      WBC, UA 0-2 /HPF      Bacteria, UA None Seen /HPF      Squamous Epithelial  "Cells, UA 0-2 /HPF      Hyaline Casts, UA 0-2 /LPF      Methodology Automated Microscopy          I ordered the above labs and reviewed the results    RADIOLOGY  CT Abdomen Pelvis With Contrast    (Results Pending)        I ordered the above noted radiological studies. Interpreted by radiologist. Discussed with radiologist (Jerrod). Reviewed by me in PACS.     Procedures      PROGRESS AND CONSULTS  ED Course as of Aug 14 1037   Wed Aug 14, 2019   1035 10:36 AM  Patient here with new onset jaundice.  Has history of treated Hep C.  Ct shows cirrhosis with infiltrative process of liver that needs MRI.  Discussed with Frieda with A.  Will admit to Dr. Dowling.  [SL]      ED Course User Index  [SL] Quinton Weiss MD     0823: Upon pt exam, discussed plan for blood work, UA, and CT Abd/Pelvis for further evaluation. Pt notified of NPO status at this time.     0824: Labs ordered for further evaluation.     0900: CT Abd/Pelvis ordered following resultant labs.     1001: Pt rechecked and resting comfortably, family at bedside. Discussed pt's prior visit at [facility] and pt states he had multiple CT scans with evidence of \"mass\", but denies follow up. Pt states he had MRI performed prior to this but states he is unsure of findings. Discussed with pt the results of labs and CT scan with evidence of elevated bilirubin and cirrhosis, as well as heterogenous liver. Informed pt of plan for admission and probable MRI of liver for further evaluation of process. Pt understands and agrees with the plan, all questions answered.    1004: Call placed to [facility].     1032: Discussed pt's case with Frieda (APRN on call for [facility] - Dr. Dowling) who agreed to admit pt to telemetry for further workup and evaluation of liver due to new jaundice and probable hepatitis.       MEDICAL DECISION MAKING  Results were reviewed/discussed with the patient and they were also made aware of online access. Pt also made aware that some labs, such as " cultures, will not be resulted during ER visit and follow up with PMD is necessary.     MDM  Number of Diagnoses or Management Options     Amount and/or Complexity of Data Reviewed  Clinical lab tests: ordered and reviewed (ALT - 54  AST - 172  Alk Phos - 173  Bilirubin - 9.6  UA: bilirubin 3+ and nitrite - positive )  Tests in the radiology section of CPT®: ordered and reviewed (CT - cirrhosis, ascites, heterogenous liver - requested admission and MRI)  Discussion of test results with the performing providers: yes (Dr. Elder (radiology) )  Review and summarize past medical records: yes (No prior records at [facility], no records in [facility] since 2015)  Discuss the patient with other providers: yes (Frieda (APRN on call for A))           DIAGNOSIS  Final diagnoses:   Acute hepatitis       DISPOSITION  ADMISSION    Discussed treatment plan and reason for admission with pt/family and admitting physician.  Pt/family voiced understanding of the plan for admission for further testing/treatment as needed.       Latest Documented Vital Signs:  As of 10:37 AM  BP- 157/97 HR- 95 Temp- 98.1 °F (36.7 °C) (Tympanic) O2 sat- 96%    --  Documentation assistance provided by ilsa Steele for Dr. Weiss.  Information recorded by the scribe was done at my direction and has been verified by me.     Tran Steele  08/14/19 1038       Quinton Weiss MD  08/14/19 2183

## 2019-08-14 NOTE — ED NOTES
MRI screening sheet completed by patient. Call placed to MRI ([facility]) to notify.     Stephenie Conway RN  08/14/19 6010

## 2019-08-14 NOTE — PLAN OF CARE
Problem: Patient Care Overview  Goal: Plan of Care Review  Outcome: Ongoing (interventions implemented as appropriate)   08/14/19 1703   Coping/Psychosocial   Plan of Care Reviewed With patient   Plan of Care Review   Progress no change   OTHER   Outcome Summary Pt admitted for epigastric and RUQ abdominal pain, N/V, yellow diarrhea, and jaundice x4 days. CT and MRI done today. Clear liquid diet. Pt c/o pain in abdomen rated 10. VSS. Will continue to monitor.     Goal: Individualization and Mutuality  Outcome: Ongoing (interventions implemented as appropriate)   08/14/19 1703   Individualization   Patient Specific Preferences Goes by Bishnu       Problem: Pain, Chronic (Adult)  Goal: Identify Related Risk Factors and Signs and Symptoms  Outcome: Ongoing (interventions implemented as appropriate)   08/14/19 1703   Pain, Chronic (Adult)   Related Risk Factors (Chronic Pain) pain control inadequate   Signs and Symptoms (Chronic Pain) verbalization of pain/discomfort for a prolonged time period     Goal: Acceptable Pain/Comfort Level and Functional Ability  Outcome: Ongoing (interventions implemented as appropriate)   08/14/19 1703   Pain, Chronic (Adult)   Acceptable Pain/Comfort Level and Functional Ability making progress toward outcome          Attending Only

## 2019-08-14 NOTE — H&P
History and Physical    Patient Name: Jose Live  Age/Sex: 54 y.o. male  : 1965  MRN: [id number]    Date of Admission: 2019  Date of Encounter Visit: 19  Encounter Provider: JENNIFER Leal  Place of Service: [facility]  Patient Care Team:  German Monahan MD as PCP - General (Internal Medicine)    Subjective:     Chief Complaint:   Chief Complaint   Patient presents with   • Abdominal Pain       History of Present Illness  Jose Live is a 54 y.o. male with a history of DM, hepatitis B and C, HLD, HTN and CAD s/p stenting. Patient presented with complaints of epigastric and RUQ abdominal pain that started 2 weeks ago and was associated with dark colored urine, yellow diarrhea, pruritis, N/V and jaundice. Patient states he was diagnosed with hepatitis B&C earlier this year and has received treatment per Dr. Patrice Tracey. He has has a prior MRI of abdomen and multiple CT after that and was recently found to have a possible lesion on the liver. Denies any personal history of cancer, but father had throat cancer. Patient denies any recent alcohol or Tylenol use, smoking or no heavy alcohol use in the past. Denies eating out recently.     In the ER he was found to have elevated LFT and bilirubin. CT of abdomen showed chronic liver disease suggestive of fibrotic steatosis vs infiltrative mass with trace perihepatic ascites and splenomegaly. There was also some bilary dilation of the right hepatic lobe.    Review of Systems   Constitutional: Negative for chills, fatigue and fever.   HENT: Negative for congestion and trouble swallowing.    Eyes: Negative for visual disturbance.   Respiratory: Negative for cough, shortness of breath and wheezing.    Cardiovascular: Negative for chest pain, palpitations and leg swelling.   Gastrointestinal: Positive for diarrhea, nausea and vomiting. Negative for abdominal pain.   Genitourinary: Positive for decreased urine volume (dark  colored urine). Negative for difficulty urinating and dysuria.   Musculoskeletal: Negative for back pain.   Skin: Positive for color change (jaundice).        pruritis   Neurological: Negative for dizziness, syncope and weakness.   Psychiatric/Behavioral: Negative for confusion. The patient is not nervous/anxious.    All other systems reviewed and are negative.     Past Medical and Surgical History:  Past Medical History:   Diagnosis Date   • Diabetes mellitus (CMS/[facility])    • Hepatitis B    • Hepatitis C    • Hyperlipidemia    • Hypertension    • Myocardial infarction (CMS/[facility]) 2014   • Substance abuse (CMS/[facility])        Past Surgical History:   Procedure Laterality Date   • CORONARY ANGIOPLASTY WITH STENT PLACEMENT         Home Medications:     (Not in a hospital admission)    Inpatient Medications:  Scheduled Meds:   Continuous Infusions:   PRN Meds:.•  sodium chloride    Allergies:  Allergies   Allergen Reactions   • Sulfa Antibiotics Hives       Past Social History:  Social History     Socioeconomic History   • Marital status:      Spouse name: Not on file   • Number of children: Not on file   • Years of education: Not on file   • Highest education level: Not on file   Tobacco Use   • Smoking status: Never Smoker   Substance and Sexual Activity   • Alcohol use: No     Frequency: Never   • Drug use: No       Past Family History:  Family History   Problem Relation Age of Onset   • Throat cancer Father        Objective:   Temp:  [98.1 °F (36.7 °C)] 98.1 °F (36.7 °C)  Heart Rate:  [] 86  Resp:  [18] 18  BP: (139-167)/() 139/94   SpO2:  [94 %-100 %] 94 %  on    Device (Oxygen Therapy): room air    Intake/Output Summary (Last 24 hours) at 8/14/2019 1321  Last data filed at 8/14/2019 1143  Gross per 24 hour   Intake 1000 ml   Output --   Net 1000 ml     Body mass index is 36.92 kg/m².      08/14/19  0808   Weight: 113 kg (250 lb)     Weight change:     Physical Exam   Constitutional: He is oriented to  person, place, and time. He appears well-developed and well-nourished. No distress.   HENT:   Head: Normocephalic and atraumatic.   Eyes: Conjunctivae are normal. Pupils are equal, round, and reactive to light. Scleral icterus (mild) is present.   Neck: Neck supple. No tracheal deviation present.   Cardiovascular: Normal rate, regular rhythm and normal heart sounds.   No murmur heard.  Pulmonary/Chest: Effort normal. He has no wheezes. He has no rales.   Diminished bases   Abdominal: Bowel sounds are normal. He exhibits distension. There is hepatosplenomegaly. There is tenderness (epigastric and RUQ). There is no rebound and no guarding.   Musculoskeletal: He exhibits no edema.   Neurological: He is alert and oriented to person, place, and time.   Skin: Skin is warm and dry. He is not diaphoretic.   jaundice   Psychiatric: He has a normal mood and affect. His behavior is normal.   Nursing note and vitals reviewed.     Lab Review:     Results from last 7 days   Lab Units 08/14/19  0819   SODIUM mmol/L 130*   POTASSIUM mmol/L 4.2   CHLORIDE mmol/L 92*   CO2 mmol/L 21.5*   BUN mg/dL 8   CREATININE mg/dL 0.88   GLUCOSE mg/dL 138*   CALCIUM mg/dL 9.4   AST (SGOT) U/L 172*   ALT (SGPT) U/L 54*     Estimated Creatinine Clearance: 116.1 mL/min (by C-G formula based on SCr of 0.88 mg/dL).                            Invalid input(s):  PHOS      Results from last 7 days   Lab Units 08/14/19  0819   WBC 10*3/mm3 8.38   HEMOGLOBIN g/dL 14.1   HEMATOCRIT % 46.3   PLATELETS 10*3/mm3 364   MCV fL 79.3   MCH pg 24.1*   MCHC g/dL 30.5*   RDW % 19.8*   RDW-SD fl 51.3   MPV fL 12.2*   NEUTROPHIL % % 70.4   LYMPHOCYTE % % 16.8*   MONOCYTES % % 8.6   EOSINOPHIL % % 1.6   BASOPHIL % % 0.7   IMM GRAN % % 1.9*   NEUTROS ABS 10*3/mm3 5.90   LYMPHS ABS 10*3/mm3 1.41   MONOS ABS 10*3/mm3 0.72   EOS ABS 10*3/mm3 0.13   BASOS ABS 10*3/mm3 0.06   IMMATURE GRANS (ABS) 10*3/mm3 0.16*   NRBC /100 WBC 0.7*     Results from last 7 days   Lab Units  08/14/19  0819   INR  1.16*               Results from last 7 days   Lab Units 08/14/19  0819   LIPASE U/L 8*         Results from last 7 days   Lab Units 08/14/19  0844   NITRITE UA  Positive*   WBC UA /HPF 0-2   BACTERIA UA /HPF None Seen   SQUAM EPITHEL UA /HPF 0-2               Imaging:  Imaging Results (last 24 hours)     Procedure Component Value Units Date/Time    CT Abdomen Pelvis With Contrast [[id number]] Collected:  08/14/19 1112     Updated:  08/14/19 1112    Narrative:       CT ABDOMEN AND PELVIS WITH CONTRAST     HISTORY: Abdominal pain.      TECHNIQUE: Axial CT images of the abdomen and pelvis were obtained  following administration of intravenous contrast. The patient was not  given oral contrast Coronal and sagittal reformats were obtained.     COMPARISON: None.      FINDINGS: The liver demonstrates a heterogenous attenuation and a  lobular outline, most concerning for chronic liver disease. There are  segmental areas of biliary dilatation seen particularly within the right  lobe. The main portal vein and right and left branch are patent, however  the segmental branches of the vein are not well imaged. There are small  perihepatic ascites present. There are small periportal and portacaval  lymph nodes that are favored to be reactive to patient's liver disease.  The spleen is enlarged, measuring 13.8 cm in cranial caudal and 15.9 cm  in AP dimension. The gallbladder is partially distended with wall  thickening. The pancreas is normal without ductal dilatation. Bilateral  adrenal glands are normal. Both kidneys are normal in size and  attenuation. Small hypodense lesion within the midpole of the right  kidney is too small to accurately characterize, however favored to  represent a cyst. The urinary bladder is partially distended and normal.  No evidence of bowel obstruction. Colonic diverticulosis is present.  Mild wall thickening of the cecum and right colon likely related to  portal venous  congestion.       Impression:       Findings suggestive of chronic liver disease/cirrhosis with  trace perihepatic ascites and splenomegaly. Heterogenous attenuation of  the liver which may be related to fibrosis steatosis, however  infiltrative mass needs to be excluded by MR imaging with and without  contrast. Correlation with alpha-fetoprotein is recommended. There is  segmental biliary dilatation within the right hepatic lobe without  evidence of obstruction and this can be evaluated on the MR as well.     These findings were discussed with Dr. Weiss by telephone who  informed me that patient has had prior imaging of the liver at [place]. Correlation with prior imaging and follow-up is recommended.     Radiation dose reduction techniques were utilized, including automated  exposure control and exposure modulation based on body size.                EKG:  No orders to display       I reviewed the patient's new clinical results and medications.  I reviewed the patient's new imaging results and agree with the interpretation.  I personally viewed and interpreted the patient's EKG/Telemetry data.      Problem List:     Active Hospital Problems    Diagnosis  POA   • **Acute hepatitis [B17.9]  Yes   • Hepatitis C [B19.20]  Unknown   • Hepatitis B [B19.10]  Unknown   • Hypertension [I10]  Unknown   • Diabetes mellitus (CMS/HCC) [E11.9]  Unknown      Resolved Hospital Problems   No resolved problems to display.       Assessment and Plan:       Acute hepatitis/ hx of  Hepatitis B&C  Acute hepatitis with hyperbilirubinemia. Hx of prior treatment for hepatitis C. There are changes on CT to suggest possible infiltrative lesion and recommended MRI for further evaluation. Given bolus of fluids in ER, but does have signs of ascites on exam. Hepatitis B and C reactive.   - check hep B surface antigen  - consult GI  - obtain prior MRI and CT from [facility]  - MRI of abdomen with contrast, MRCP  - PRN pain and  nausea meds  -check alpha fetoprotein       Hypertension  BP slightly elevated.   - continue home lisinopril    Diabetes Mellitus type 2   - complications include: cardiovascular disease  - LANTUS 10 units SC nightly.  - HUMALOG - moderate dose correctional factor as needed.  - Monitor glucose ACHS  - Treat hypoglycemia per protocol for any BG less than 70 mg/dL  - HgbA1C     Admit  DVT prophylaxis: SCD's  Code status addressed: full code  Diet: Clear liquid advance as tolerated  Consult CCP to help with DC planning    I discussed the patients findings and my recommendations with patient.      JENNIFER Leal  [facility]  08/14/19  1:21 PM    Dictated utilizing Dragon dictation

## 2019-08-15 ENCOUNTER — INPATIENT HOSPITAL (OUTPATIENT)
Dept: URBAN - METROPOLITAN AREA HOSPITAL 113 | Facility: HOSPITAL | Age: 54
End: 2019-08-15
Payer: MEDICAID

## 2019-08-15 DIAGNOSIS — R10.11 RIGHT UPPER QUADRANT PAIN: ICD-10-CM

## 2019-08-15 DIAGNOSIS — R16.0 HEPATOMEGALY, NOT ELSEWHERE CLASSIFIED: ICD-10-CM

## 2019-08-15 DIAGNOSIS — B19.10 UNSPECIFIED VIRAL HEPATITIS B WITHOUT HEPATIC COMA: ICD-10-CM

## 2019-08-15 DIAGNOSIS — R63.4 ABNORMAL WEIGHT LOSS: ICD-10-CM

## 2019-08-15 DIAGNOSIS — R10.13 EPIGASTRIC PAIN: ICD-10-CM

## 2019-08-15 DIAGNOSIS — R14.0 ABDOMINAL DISTENSION (GASEOUS): ICD-10-CM

## 2019-08-15 PROCEDURE — 99223 1ST HOSP IP/OBS HIGH 75: CPT | Performed by: INTERNAL MEDICINE

## 2019-08-15 NOTE — CONSULTS
Subjective     REASON FOR CONSULTATION: Probable hepatocellular carcinoma in association with hepatitis B surface antigen positivity and hep C treated and IV drug abuse    Provide an opinion on any further workup or treatment                             REQUESTING PHYSICIAN:  [facility]    RECORDS OBTAINED:  Records of the patients history including those obtained from the referring provider were reviewed and summarized in detail.    HISTORY OF PRESENT ILLNESS:  The patient is a 54 y.o. year old male who is here for an opinion about the above issue.    History of Present Illness patient is a 54-year-old white male with chronic hepatitis B and hepatitis C which was treated earlier this year by Dr. Patrice Santos with apparent resolution of his viral hepatitis C load who presents with a 2-week history of increasing right upper quadrant discomfort and back pain.  During this.  The patient is lost 30 pounds of weight over the last 2 months and is also developed dark brownish urine light stools associated with a loss of appetite.  Patient was seen in the emergency room and found to have marked hepato-splenomegaly small amount of ascites and a possible infiltrative mass in the liver and MRI confirmed multifocal lesions throughout both lobes of the liver with some reactive lymph nodes in the jone hepatis.  The patient underwent a biopsy of the abnormality the liver at the same time had testing which showed elevation of his alpha-fetoprotein to greater than 60,500 very suspicious/consistent with hepatocellular carcinoma.  Biopsies currently pending    Patient has a history of IV drug abuse including cocaine and heroin.  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 alcohol abuse and smoking tobacco  He is  and lives with his oldest son    Family history is noncontributory    He is also diabetic and has had heart attacks 5 years ago and has a  stent in place    Past Medical History:   Diagnosis Date   • Diabetes mellitus (CMS/HCC)    • Hepatitis B    • Hepatitis C    • Hyperlipidemia    • Hypertension    • Myocardial infarction (CMS/HCC) 2014   • Substance abuse (CMS/HCC)         Past Surgical History:   Procedure Laterality Date   • CORONARY ANGIOPLASTY WITH STENT PLACEMENT          No current facility-administered medications on file prior to encounter.      Current Outpatient Medications on File Prior to Encounter   Medication Sig Dispense Refill   • Insulin Degludec (TRESIBA SC) Inject  under the skin into the appropriate area as directed.     • lisinopril (PRINIVIL,ZESTRIL) 20 MG tablet Take 20 mg by mouth Daily.          ALLERGIES:    Allergies   Allergen Reactions   • Sulfa Antibiotics Hives        Social History     Socioeconomic History   • Marital status:      Spouse name: Not on file   • Number of children: Not on file   • Years of education: Not on file   • Highest education level: Not on file   Tobacco Use   • Smoking status: Never Smoker   Substance and Sexual Activity   • Alcohol use: No     Frequency: Never   • Drug use: No        Family History   Problem Relation Age of Onset   • Throat cancer Father         Review of Systems   Constitutional: Positive for activity change, appetite change and unexpected weight change (30 pounds in 2 months).   Gastrointestinal: Positive for abdominal pain.        Pale stools for 2 weeks   Genitourinary:        Dark brown urine for 2 weeks   Musculoskeletal: Positive for back pain.        Objective     Vitals:    08/15/19 0452 08/15/19 0755 08/15/19 0950 08/15/19 1034   BP: 155/93 167/95 141/79 150/90   BP Location: Right arm Right arm Left arm Left arm   Patient Position: Lying Lying Lying Lying   Pulse: 84 84 87 80   Resp: 18 18 16 16   Temp: 97.1 °F (36.2 °C) 96.8 °F (36 °C)     TempSrc: Oral Oral     SpO2: 93% 96% 95% 94%   Weight:       Height:         No flowsheet data found.    Physical Exam     GENERAL:  Well-developed, well-nourished in no acute distress.   SKIN:  Warm, dry without rashes, purpura or petechiae.  EYES:  Pupils equal, round and reactive to light.  EOMs intact.  Conjunctivae normal.  EARS:  Hearing intact.  NOSE:  Septum midline.  No excoriations or nasal discharge.  MOUTH:  Tongue is well-papillated; no stomatitis or ulcers.  Lips normal.  THROAT:  Oropharynx without lesions or exudates.  NECK:  Supple with good range of motion; no thyromegaly or masses, no JVD.  LYMPHATICS:  No cervical, supraclavicular, axillary or inguinal adenopathy.  CHEST:  Lungs clear to auscultation. Good airflow.  CARDIAC:  Regular rate and rhythm without murmurs, rubs or gallops. Normal S1,S2.  ABDOMEN: Distended tender in the right upper quadrant unable to accurately examine him because of tenderness bowel sounds are present   EXTREMITIES:  No clubbing, cyanosis or edema.  NEUROLOGICAL:  Cranial Nerves II-XII grossly intact.  No focal neurological deficits.  PSYCHIATRIC:  Normal affect and mood.        RECENT LABS:  Hematology WBC   Date Value Ref Range Status   08/14/2019 8.38 3.40 - 10.80 10*3/mm3 Final     RBC   Date Value Ref Range Status   08/14/2019 5.84 (H) 4.14 - 5.80 10*6/mm3 Final     Hemoglobin   Date Value Ref Range Status   08/14/2019 14.1 13.0 - 17.7 g/dL Final     Hematocrit   Date Value Ref Range Status   08/14/2019 46.3 37.5 - 51.0 % Final     Platelets   Date Value Ref Range Status   08/14/2019 364 140 - 450 10*3/mm3 Final          Assessment/Plan   1.  Hepatocellular carcinoma multifocal in both lobes of the liver with intrahepatic obstruction causing hyperbilirubinemia and obstructive pattern  · Prognostic grouping as a child/Lemon class B liver disease      2.  Hepatitis B surface antigen positive  3.  Hepatitis C treated  4.  Substance abuse  5.  Coronary artery disease post MI 2014 with stent  6.  Diabetes mellitus    Plan  Await biopsy  Check hepatitis C viral titers  Bone scan    I had  a long discussion with the patient regarding the seriousness of the condition and the inability to cure this.  His elevated bilirubin and underlying liver disease will make it challenging to give him standard systemic therapy although lenvatinib at a lower dose may be option.  Intrahepatic therapy not done with bilirubin over 2 - exclude systemic metastatic disease with a CAT scan and bone scan  Will follow with you

## 2019-08-15 NOTE — PAYOR COMM NOTE
Kayley Live (54 y.o. Male)     PLEASE SEE ATTACHED CLINICAL REVIEW.  PT. WAS ADMITTED TO [facility] ON 8/14/19 MEDICAL FLOOR      PLEASE CALL SYBIL @ [phone] OR  [phone] WITH INPT AUTH AND DAYS APPROVED.     THANK YOU    DARIANA RICHTER LPN CCP        Criteria Review   REVIEW SUMMARY     Patient: KAYLEY LIVE  Review Number: [id number]  Review Status: In Primary     Condition Specific: Yes        OUTCOMES  Outcome Type: Primary           REVIEW DETAILS     Product: LOC:Acute Adult  Subset: General Medical      (Symptom or finding within 24h)         (Excludes PO medications unless noted)          [X] Select Day, One:              [X] Episode Day 1, One:                  [X] ACUTE, >= One:                      [X] Gastrointestinal or biliary, One:                          [X] Other gastrointestinal diagnosis, actual or suspected, >= One:                              [X] Jaundice or bilirubin > 2.5 mg/dL(42.8 µmol/L), All:                                  [X] Jaundice or bilirubin > 2.5 mg/dL(42.8 µmol/L)                                  [X] Finding, >= One:                                      [X] Abdominal pain                                  [X] Intervention, >= One:                                      [X] Liver biopsy     Version: [employer]® 2018.3  [employer]® and [employer]®  © 2018 [employer] and/or one of its subsidiaries.  All Rights Reserved.  CPT only © 2017 American Medical Association.  All Rights Reserved.               Date of Birth Social Security Number Address Home Phone MRN    1965  [address] [place] [address] [phone] [id number]    Mosque Marital Status          None        Admission Date Admission Type Admitting Provider Attending Provider Department, Room/Bed    8/14/19 Emergency Jamie Dowling MD Baumann, Patrick D, MD [address], [facility]/1    Discharge Date Discharge Disposition Discharge Destination   "                     Attending Provider:  Denzel Adrian MD    Allergies:  Sulfa Antibiotics    Isolation:  None   Infection:  None   Code Status:  CPR    Ht:  175.3 cm (69\")   Wt:  113 kg (250 lb)    Admission Cmt:  None   Principal Problem:  Acute hepatitis [B17.9]                 Active Insurance as of 2019     Primary Coverage     Payor Plan Insurance Group Employer/Plan Group    [facility] MEDICAID      Payor Plan Address Payor Plan Phone Number Payor Plan Fax Number Effective Dates    [address] [address] [phone]  2019 - None Entered    [facility] [id number]       Subscriber Name Subscriber Birth Date Member ID       KAYLEY LIVE 1965 [id number]                 Emergency Contacts      (Rel.) Home Phone Work Phone Mobile Phone    Lacey Live (Mother) [phone] -- --    Eliot Live (Father) [phone] -- --               History & Physical      Frieda Dior APRN at 2019  1:18 PM     Attestation signed by Jamie Dowling MD at 2019  5:32 PM    I have reviewed the history and plan as obtained by JENNIFER Leal. I have independently examined the patient. My exam confirms her physical findings and I agree with the plan as listed, with the addition to the followin-year-old admitted with upper quadrant right-sided abdominal pain.  He has a history of diabetes, hepatitis B and C, coronary artery disease.  On exam his abdomen is obese and has some right upper quadrant tenderness.  MRI shows extensive hepatic metastatic disease.  Total bili is 9.6.  AFP is greater than 60,000.  Likely needs biopsy.  GI to see.  Will ask oncology to see also.    Jamie Dowling MD  19  5:29 PM                      History and Physical    Patient Name: Kayley Live  Age/Sex: 54 y.o. male  : 1965  MRN: [id number]    Date of Admission: 2019  Date of Encounter Visit: 19  Encounter Provider: JENNIFER Leal  Place of " Service: [facility]  Patient Care Team:  German Monahan MD as PCP - General (Internal Medicine)    Subjective:     Chief Complaint:   Chief Complaint   Patient presents with   • Abdominal Pain       History of Present Illness  Jose Live is a 54 y.o. male with a history of DM, hepatitis B and C, HLD, HTN and CAD s/p stenting. Patient presented with complaints of epigastric and RUQ abdominal pain that started 2 weeks ago and was associated with dark colored urine, yellow diarrhea, pruritis, N/V and jaundice. Patient states he was diagnosed with hepatitis B&C earlier this year and has received treatment per Dr. Patrice Tracey. He has has a prior MRI of abdomen and multiple CT after that and was recently found to have a possible lesion on the liver. Denies any personal history of cancer, but father had throat cancer. Patient denies any recent alcohol or Tylenol use, smoking or no heavy alcohol use in the past. Denies eating out recently.     In the ER he was found to have elevated LFT and bilirubin. CT of abdomen showed chronic liver disease suggestive of fibrotic steatosis vs infiltrative mass with trace perihepatic ascites and splenomegaly. There was also some bilary dilation of the right hepatic lobe.    Review of Systems   Constitutional: Negative for chills, fatigue and fever.   HENT: Negative for congestion and trouble swallowing.    Eyes: Negative for visual disturbance.   Respiratory: Negative for cough, shortness of breath and wheezing.    Cardiovascular: Negative for chest pain, palpitations and leg swelling.   Gastrointestinal: Positive for diarrhea, nausea and vomiting. Negative for abdominal pain.   Genitourinary: Positive for decreased urine volume (dark colored urine). Negative for difficulty urinating and dysuria.   Musculoskeletal: Negative for back pain.   Skin: Positive for color change (jaundice).        pruritis   Neurological: Negative for dizziness, syncope and weakness.    Psychiatric/Behavioral: Negative for confusion. The patient is not nervous/anxious.    All other systems reviewed and are negative.     Past Medical and Surgical History:  Past Medical History:   Diagnosis Date   • Diabetes mellitus (CMS/HCC)    • Hepatitis B    • Hepatitis C    • Hyperlipidemia    • Hypertension    • Myocardial infarction (CMS/HCC) 2014   • Substance abuse (CMS/HCC)        Past Surgical History:   Procedure Laterality Date   • CORONARY ANGIOPLASTY WITH STENT PLACEMENT         Home Medications:     (Not in a hospital admission)    Inpatient Medications:  Scheduled Meds:   Continuous Infusions:   PRN Meds:.•  sodium chloride    Allergies:  Allergies   Allergen Reactions   • Sulfa Antibiotics Hives       Past Social History:  Social History     Socioeconomic History   • Marital status:      Spouse name: Not on file   • Number of children: Not on file   • Years of education: Not on file   • Highest education level: Not on file   Tobacco Use   • Smoking status: Never Smoker   Substance and Sexual Activity   • Alcohol use: No     Frequency: Never   • Drug use: No       Past Family History:  Family History   Problem Relation Age of Onset   • Throat cancer Father        Objective:   Temp:  [98.1 °F (36.7 °C)] 98.1 °F (36.7 °C)  Heart Rate:  [] 86  Resp:  [18] 18  BP: (139-167)/() 139/94   SpO2:  [94 %-100 %] 94 %  on    Device (Oxygen Therapy): room air    Intake/Output Summary (Last 24 hours) at 8/14/2019 1321  Last data filed at 8/14/2019 1143  Gross per 24 hour   Intake 1000 ml   Output --   Net 1000 ml     Body mass index is 36.92 kg/m².      08/14/19  0808   Weight: 113 kg (250 lb)     Weight change:     Physical Exam   Constitutional: He is oriented to person, place, and time. He appears well-developed and well-nourished. No distress.   HENT:   Head: Normocephalic and atraumatic.   Eyes: Conjunctivae are normal. Pupils are equal, round, and reactive to light. Scleral icterus  (mild) is present.   Neck: Neck supple. No tracheal deviation present.   Cardiovascular: Normal rate, regular rhythm and normal heart sounds.   No murmur heard.  Pulmonary/Chest: Effort normal. He has no wheezes. He has no rales.   Diminished bases   Abdominal: Bowel sounds are normal. He exhibits distension. There is hepatosplenomegaly. There is tenderness (epigastric and RUQ). There is no rebound and no guarding.   Musculoskeletal: He exhibits no edema.   Neurological: He is alert and oriented to person, place, and time.   Skin: Skin is warm and dry. He is not diaphoretic.   jaundice   Psychiatric: He has a normal mood and affect. His behavior is normal.   Nursing note and vitals reviewed.                  EKG:  No orders to display       I reviewed the patient's new clinical results and medications.  I reviewed the patient's new imaging results and agree with the interpretation.  I personally viewed and interpreted the patient's EKG/Telemetry data.      Problem List:     Active Hospital Problems    Diagnosis  POA   • **Acute hepatitis [B17.9]  Yes   • Hepatitis C [B19.20]  Unknown   • Hepatitis B [B19.10]  Unknown   • Hypertension [I10]  Unknown   • Diabetes mellitus (CMS/HCC) [E11.9]  Unknown      Resolved Hospital Problems   No resolved problems to display.       Assessment and Plan:       Acute hepatitis/ hx of  Hepatitis B&C  Acute hepatitis with hyperbilirubinemia. Hx of prior treatment for hepatitis C. There are changes on CT to suggest possible infiltrative lesion and recommended MRI for further evaluation. Given bolus of fluids in ER, but does have signs of ascites on exam. Hepatitis B and C reactive.   - check hep B surface antigen  - consult GI  - obtain prior MRI and CT from [facility]  - MRI of abdomen with contrast, MRCP  - PRN pain and nausea meds  -check alpha fetoprotein       Hypertension  BP slightly elevated.   - continue home lisinopril    Diabetes Mellitus type 2   - complications  "include: cardiovascular disease  - LANTUS 10 units SC nightly.  - HUMALOG - moderate dose correctional factor as needed.  - Monitor glucose ACHS  - Treat hypoglycemia per protocol for any BG less than 70 mg/dL  - HgbA1C     Admit  DVT prophylaxis: SCD's  Code status addressed: full code  Diet: Clear liquid advance as tolerated  Consult CCP to help with DC planning    I discussed the patients findings and my recommendations with patient.      JENNIFER Leal  [facility]  08/14/19  1:21 PM    Dictated utilizing Dragon dictation      Electronically signed by Jamie Dowling MD at 8/14/2019  5:32 PM          Emergency Department Notes      Degonda, Janet, RN at 8/14/2019  8:07 AM        abd pain started 2 weeks ago.  Pt is jaundice.  Nvd.      Electronically signed by Degonda, Janet, RN at 8/14/2019  8:07 AM     Quinton Weiss MD at 8/14/2019  8:17 AM           EMERGENCY DEPARTMENT ENCOUNTER    CHIEF COMPLAINT  Chief Complaint: Abdominal Pain   History given by: Patient   History limited by: none   Room Number: 02/02  PMD: German Monahan MD      HPI:  Pt is a 54 y.o. male who presents complaining of epigastric and RUQ abd pain for the past 2 weeks. Pt confirms jaundice over the last few days, as well as nausea, vomiting, \"yellow\" diarrhea, and increased concentration of urine. Per pt, he has not seen or discussed case with primary care. Pt states he has hx of hepatitis B and C, which he was told was resolved with medications. Pt affirms he was diagnosed with \"mass\" in abd 6 months ago at [place], but is unable to specify any further details. Pt denies hx of abdominal surgeries, hx of EtOH usage, hx of smoking, or any overuse of Tylenol.     Duration:  2 weeks   Onset: gradual   Timing: constant  Location: epigastric and RUQ  Radiation: none   Quality: pain   Intensity/Severity: moderate   Progression: unchanged   Associated Symptoms: jaundice, nausea, vomiting, diarrhea, " "concentrated urine   Aggravating Factors: none   Alleviating Factors: none   Previous Episodes: none  Treatment before arrival: none     PAST MEDICAL HISTORY  Active Ambulatory Problems     Diagnosis Date Noted   • No Active Ambulatory Problems     Resolved Ambulatory Problems     Diagnosis Date Noted   • No Resolved Ambulatory Problems     Past Medical History:   Diagnosis Date   • Diabetes mellitus (CMS/HCC)    • Hepatitis B    • Hepatitis C    • Hyperlipidemia    • Hypertension    • Myocardial infarction (CMS/HCC) 2014   • Substance abuse (CMS/HCC)        PAST SURGICAL HISTORY  Past Surgical History:   Procedure Laterality Date   • CORONARY ANGIOPLASTY WITH STENT PLACEMENT         FAMILY HISTORY  History reviewed. No pertinent family history.    SOCIAL HISTORY  Social History     Socioeconomic History   • Marital status:      Spouse name: Not on file   • Number of children: Not on file   • Years of education: Not on file   • Highest education level: Not on file   Tobacco Use   • Smoking status: Never Smoker   Substance and Sexual Activity   • Alcohol use: No     Frequency: Never   • Drug use: No       ALLERGIES  Sulfa antibiotics    REVIEW OF SYSTEMS  Review of Systems   Constitutional: Negative for activity change, appetite change and fever.   HENT: Negative for congestion and sore throat.    Eyes: Negative.    Respiratory: Negative for cough and shortness of breath.    Cardiovascular: Negative for chest pain and leg swelling.   Gastrointestinal: Positive for abdominal pain (epigastric and RUQ), diarrhea (\"yellow\"), nausea and vomiting.   Endocrine: Negative.    Genitourinary: Negative for decreased urine volume and dysuria.        (+) concentrated urine   Musculoskeletal: Negative for neck pain.   Skin: Positive for color change (jaundice). Negative for rash and wound.   Allergic/Immunologic: Negative.    Neurological: Negative for weakness, numbness and headaches.   Hematological: Negative.  "   Psychiatric/Behavioral: Negative.    All other systems reviewed and are negative.      PHYSICAL EXAM  ED Triage Vitals   Temp Heart Rate Resp BP SpO2   08/14/19 0808 08/14/19 0808 08/14/19 0808 08/14/19 0814 08/14/19 0808   98.1 °F (36.7 °C) 111 18 (!) 167/109 97 %      Temp src Heart Rate Source Patient Position BP Location FiO2 (%)   08/14/19 0808 08/14/19 0808 -- -- --   Tympanic Monitor          Physical Exam   Constitutional: He is oriented to person, place, and time. He appears jaundiced. No distress.   HENT:   Head: Normocephalic and atraumatic.   Eyes: EOM are normal. Pupils are equal, round, and reactive to light. Scleral icterus is present.   Neck: Normal range of motion. Neck supple.   Cardiovascular: Normal rate, regular rhythm and normal heart sounds.   Pulmonary/Chest: Effort normal and breath sounds normal. No respiratory distress.   Abdominal: Soft. He exhibits distension. There is hepatomegaly. There is tenderness in the right upper quadrant and epigastric area. There is no rebound and no guarding.   Musculoskeletal: Normal range of motion. He exhibits no edema.   Neurological: He is alert and oriented to person, place, and time. He has normal sensation and normal strength.   Skin: Skin is warm and dry.   Psychiatric: Mood and affect normal.   Nursing note and vitals reviewed.        I ordered the above labs and reviewed the results    RADIOLOGY  CT Abdomen Pelvis With Contrast    (Results Pending)        I ordered the above noted radiological studies. Interpreted by radiologist. Discussed with radiologist (Jerrod). Reviewed by me in PACS.     Procedures      PROGRESS AND CONSULTS  ED Course as of Aug 14 1037   Wed Aug 14, 2019   1035 10:36 AM  Patient here with new onset jaundice.  Has history of treated Hep C.  Ct shows cirrhosis with infiltrative process of liver that needs MRI.  Discussed with Frieda with JACK.  Will admit to Dr. Dowling.  [SL]      ED Course User Index  [SL] Isela  "MD Quinton     0823: Upon pt exam, discussed plan for blood work, UA, and CT Abd/Pelvis for further evaluation. Pt notified of NPO status at this time.     0824: Labs ordered for further evaluation.     0900: CT Abd/Pelvis ordered following resultant labs.     1001: Pt rechecked and resting comfortably, family at bedside. Discussed pt's prior visit at [place] and pt states he had multiple CT scans with evidence of \"mass\", but denies follow up. Pt states he had MRI performed prior to this but states he is unsure of findings. Discussed with pt the results of labs and CT scan with evidence of elevated bilirubin and cirrhosis, as well as heterogenous liver. Informed pt of plan for admission and probable MRI of liver for further evaluation of process. Pt understands and agrees with the plan, all questions answered.    1004: Call placed to [facility].     1032: Discussed pt's case with Frieda (APRN on call for [facility] - Dr. Dowling) who agreed to admit pt to telemetry for further workup and evaluation of liver due to new jaundice and probable hepatitis.       MEDICAL DECISION MAKING  Results were reviewed/discussed with the patient and they were also made aware of online access. Pt also made aware that some labs, such as cultures, will not be resulted during ER visit and follow up with PMD is necessary.     MDM  Number of Diagnoses or Management Options     Amount and/or Complexity of Data Reviewed  Clinical lab tests: ordered and reviewed (ALT - 54  AST - 172  Alk Phos - 173  Bilirubin - 9.6  UA: bilirubin 3+ and nitrite - positive )  Tests in the radiology section of CPT®:  ordered and reviewed (CT - cirrhosis, ascites, heterogenous liver - requested admission and MRI)  Discussion of test results with the performing providers: yes (Dr. Elder (radiology) )  Review and summarize past medical records: yes (No prior records at [facility], no records in [facility] since 2015)  Discuss the patient with other providers: yes (Frieda" (APRN on call for [facility]))           DIAGNOSIS  Final diagnoses:   Acute hepatitis       DISPOSITION  ADMISSION    Discussed treatment plan and reason for admission with pt/family and admitting physician.  Pt/family voiced understanding of the plan for admission for further testing/treatment as needed.       Latest Documented Vital Signs:  As of 10:37 AM  BP- 157/97 HR- 95 Temp- 98.1 °F (36.7 °C) (Tympanic) O2 sat- 96%    --  Documentation assistance provided by ilsa Steele for Dr. Weiss.  Information recorded by the scribe was done at my direction and has been verified by me.     Tran Steele  08/14/19 1038       Quinton Weiss MD  08/14/19 1326      Electronically signed by Quinton Weiss MD at 8/14/2019  1:26 PM     Stephenie Conway, RN at 8/14/2019  1:12 PM        MRI screening sheet completed by patient. Call placed to MRI ([facility]) to notify.     Stephenie Conway RN  08/14/19 1312      Electronically signed by Stephenie Conway, RN at 8/14/2019  1:12 PM     Summer Schroeder, RN at 8/14/2019  2:41 PM        Spoke with Alok in MRI. They will sent pt to inpatient room P382 when MRI complete since room is clean.     Summer Schroeder, RN  08/14/19 1441      Electronically signed by Summer Schroeder, RN at 8/14/2019  2:41 PM          Intake and Output (last 48 hours)      Intake/Output     Row Name 08/15/19 0853 08/15/19 0452 08/15/19 0113 08/14/19 1446 08/14/19 1143       Weights    Weight  --  --  --  113 kg (249 lb 1.9 oz)  --    Ideal Body Weight (IBW) (kg)  --  --  --  73.73  --    BSA (Calculated - sq m)  --  --  --  2.27 sq meters  --    BMI (Calculated)  --  --  --  36.8  --       Intake    P.O.  --  480 mL  600 mL  --  --       sodium chloride 0.9 % bolus 1,000 mL     Start: 08/14/19 0826    Dose  --  --  --  --  *0 mL    Volume (mL)  --  --  --  --  1000       PCA Settings - Hydromorphone (0.2 mg/mL)    MAR Action  New Syringe/Cartridge  --  --  --  --    Nurse Loading Dose (mg)  0.2 mg   --  --  --  --    Patient Bolus Dose (mg)  0.1 mg  --  --  --  --    Lockout Interval (min)  8 Minutes  --  --  --  --    Basal Rate (mg/hr)  0 mg/hr  --  --  --  --    One Hour Dose Limit (mg)  0.75 mg  --  --  --  --       Urine Output    Urine Unmeasured Occurrence  --  5 pt stated  --  --  --    Urinary Incontinence  --  No  --  --  --    Urine Amount  --  Unable to assess  --  --  --    Row Name 08/14/19 0827 08/14/19 0808                Weights    Weight  --  113 kg (250 lb)       Weight Method  --  Estimated       Ideal Body Weight (IBW) (kg)  --  73.69       BSA (Calculated - sq m)  --  2.27 sq meters       BMI (Calculated)  --  36.9          sodium chloride 0.9 % bolus 1,000 mL     Start: 08/14/19 0826    Dose  *1000 mL  --           Lines, Drains & Airways    Active LDAs     Name:   Placement date:   Placement time:   Site:   Days:    Peripheral IV 08/15/19 0805 Right;Medial Forearm   08/15/19    0805    Forearm   less than 1                Lab Results (all)     Procedure Component Value Units Date/Time    POC Glucose Once [[id number]]  (Normal) Collected:  08/15/19 0757    Specimen:  Blood Updated:  08/15/19 0800     Glucose 92 mg/dL     Comprehensive Metabolic Panel [[id number]]  (Abnormal) Collected:  08/15/19 0458    Specimen:  Blood from Arm, Right Updated:  08/15/19 0559     Glucose 94 mg/dL      BUN 8 mg/dL      Creatinine 0.87 mg/dL      Sodium 134 mmol/L      Potassium 3.9 mmol/L      Chloride 97 mmol/L      CO2 21.6 mmol/L      Calcium 9.4 mg/dL      Total Protein 6.9 g/dL      Albumin 3.10 g/dL      ALT (SGPT) 47 U/L      AST (SGOT) 148 U/L      Alkaline Phosphatase 152 U/L      Total Bilirubin 9.2 mg/dL      eGFR Non African Amer 91 mL/min/1.73      Globulin 3.8 gm/dL      A/G Ratio 0.8 g/dL      BUN/Creatinine Ratio 9.2     Anion Gap 15.4 mmol/L     Narrative:       GFR Normal >60  Chronic Kidney Disease <60  Kidney Failure <15    POC Glucose Once [[id number]]  (Normal) Collected:  08/14/19 2016     Specimen:  Blood Updated:  08/14/19 2017     Glucose 116 mg/dL     POC Glucose Once [[id number]]  (Abnormal) Collected:  08/14/19 1730    Specimen:  Blood Updated:  08/14/19 1733     Glucose 182 mg/dL     Hemoglobin A1c [[id number]]  (Abnormal) Collected:  08/14/19 0819    Specimen:  Blood Updated:  08/14/19 1407     Hemoglobin A1C 11.43 %     Narrative:       AFP Tumor Marker [[id number]]  (Abnormal) Collected:  08/14/19 1204    Specimen:  Blood Updated:  08/14/19 1402     ALPHA-FETOPROTEIN >60,500 ng/mL     Narrative:       Alpha Fetoprotein Tumor Marker Reference Range:    0.0-8.3 ng/mL    Note: Normal values apply only to males and nonpregnant females. These results are not interpretable for pregnant females.    Hepatitis B Surface Antigen [[id number]] Collected:  08/14/19 1204    Specimen:  Blood Updated:  08/14/19 1341    Hepatitis Panel, Acute [[id number]]  (Abnormal) Collected:  08/14/19 0819    Specimen:  Blood Updated:  08/14/19 1133     Hepatitis B Surface Ag Reactive     Hep A IgM Non-Reactive     Hep B C IgM Non-Reactive     Hepatitis C Ab Reactive     Comment: Auto resulted.       Comprehensive Metabolic Panel [[id number]]  (Abnormal) Collected:  08/14/19 0819    Specimen:  Blood Updated:  08/14/19 0909     Glucose 138 mg/dL      BUN 8 mg/dL      Creatinine 0.88 mg/dL      Sodium 130 mmol/L      Potassium 4.2 mmol/L      Chloride 92 mmol/L      CO2 21.5 mmol/L      Calcium 9.4 mg/dL      Total Protein 7.5 g/dL      Albumin 3.40 g/dL      ALT (SGPT) 54 U/L      AST (SGOT) 172 U/L      Alkaline Phosphatase 173 U/L      Total Bilirubin 9.6 mg/dL      eGFR Non African Amer 90 mL/min/1.73      Globulin 4.1 gm/dL      A/G Ratio 0.8 g/dL      BUN/Creatinine Ratio 9.1     Anion Gap 16.5 mmol/L     Narrative:       Urinalysis With Microscopic If Indicated (No Culture) - Urine, Clean Catch [[id number]]  (Abnormal) Collected:  08/14/19 0844    Specimen:  Urine, Clean Catch Updated:  08/14/19 0908     Color, UA Dark  Yellow     Appearance, UA Clear     pH, UA 5.5     Specific Gravity, UA 1.024     Glucose, UA Negative     Ketones, UA Negative     Bilirubin, UA Large (3+)     Blood, UA Negative     Protein, UA 30 mg/dL (1+)     Leuk Esterase, UA Trace     Nitrite, UA Positive     Urobilinogen, UA 1.0 E.U./dL    Urinalysis, Microscopic Only - Urine, Clean Catch [[id number]] Collected:  08/14/19 0844    Specimen:  Urine, Clean Catch Updated:  08/14/19 0908     RBC, UA 0-2 /HPF      WBC, UA 0-2 /HPF      Bacteria, UA None Seen /HPF      Squamous Epithelial Cells, UA 0-2 /HPF      Hyaline Casts, UA 0-2 /LPF      Methodology Automated Microscopy    Lipase [[id number]]  (Abnormal) Collected:  08/14/19 0819    Specimen:  Blood Updated:  08/14/19 0906     Lipase 8 U/L     Ethanol [[id number]] Collected:  08/14/19 0819    Specimen:  Blood Updated:  08/14/19 0906     Ethanol <10 mg/dL      Ethanol % <0.010 %     Acetaminophen Level [[id number]]  (Abnormal) Collected:  08/14/19 0819    Specimen:  Blood Updated:  08/14/19 0906     Acetaminophen <5.0 mcg/mL     Protime-INR [[id number]]  (Abnormal) Collected:  08/14/19 0819    Specimen:  Blood Updated:  08/14/19 0850     Protime 14.5 Seconds      INR 1.16    CBC & Differential [[id number]] Collected:  08/14/19 0819    Specimen:  Blood Updated:  08/14/19 0839    Narrative:       CBC Auto Differential [[id number]]  (Abnormal) Collected:  08/14/19 0819    Specimen:  Blood Updated:  08/14/19 0839     WBC 8.38 10*3/mm3      RBC 5.84 10*6/mm3      Hemoglobin 14.1 g/dL      Hematocrit 46.3 %      MCV 79.3 fL      MCH 24.1 pg      MCHC 30.5 g/dL      RDW 19.8 %      RDW-SD 51.3 fl      MPV 12.2 fL      Platelets 364 10*3/mm3      Neutrophil % 70.4 %      Lymphocyte % 16.8 %      Monocyte % 8.6 %      Eosinophil % 1.6 %      Basophil % 0.7 %      Immature Grans % 1.9 %      Neutrophils, Absolute 5.90 10*3/mm3      Lymphocytes, Absolute 1.41 10*3/mm3      Monocytes, Absolute 0.72 10*3/mm3      Eosinophils,  Absolute 0.13 10*3/mm3      Basophils, Absolute 0.06 10*3/mm3      Immature Grans, Absolute 0.16 10*3/mm3      nRBC 0.7 /100 WBC           Imaging Results (all)     Procedure Component Value Units Date/Time    CT Needle Biopsy Liver [[id number]] Updated:  08/15/19 1030    Specimen:  Tissue     MRI abdomen w wo contrast mrcp [[id number]] Collected:  08/14/19 1638     Updated:  08/14/19 1657    Narrative:       MRI ABDOMEN W WO CONTRAST MRCP-     CLINICAL HISTORY: 54-year-old male with history of hepatitis B and  hepatitis C. Abnormal liver function tests. Hyperbilirubinemia. Markedly  elevated alpha-fetoprotein. Follow-up abnormal appearance of liver seen  on CT performed earlier today.     TECHNIQUE: Multiple axial gradient echo T1 and T2-weighted images were  obtained. Oblique coronal T2-weighted images were obtained visualize the  bile ducts. Axial and coronal T1-weighted images were also acquired  after intravenous injection of MultiHance contrast.      COMPARISON: CT scan of the abdomen and pelvis performed earlier today.     FINDINGS: There is moderate hepatomegaly. The liver measures 24.9 cm in  greatest cephalocaudad dimension. The liver has slightly lobulated  margins. There are numerous nodular and confluent areas of abnormal  diminished signal intensity throughout both lobes of liver on the  T1-weighted images that show only mild abnormal increased signal on the  T2-weighted images. These areas of abnormal signal demonstrate  diminished enhancement on the postcontrast images. Extensive hepatic  metastatic disease, likely metastatic hepatocellular carcinoma, in light  of the  markedly elevated alpha-fetoprotein is suspected. Tumor in the  region of the jone hepatis is producing obstruction of the proximal  right and left intrahepatic bile ducts and the common hepatic duct,  resulting in mild ductal dilatation within both the right and left lobes  of liver. Further evaluation with percutaneous CT guided  biopsy is  suggested. Biopsy of the left lobe would be easiest and should yield a  diagnosis due to the numerous nodules seen on the postcontrast images in  this region. The common bile duct is not dilated. It measures 5 to 6 mm  in diameter. No gallstones are identified. The spleen is borderline  enlarged. It measures 16.3 cm in greatest transverse diameter. The  pancreas and adrenal glands are unremarkable.       Impression:       Moderate hepatomegaly. Extensive heterogeneous and nodular  abnormal signal intensity and enhancement throughout the liver with  intrahepatic bile duct obstruction region of the jone hepatis.  Extensive hepatic metastatic disease, most likely hepatocellular  carcinoma in light of the patient's markedly elevated alpha-fetoprotein  is suspected. Further evaluation with biopsy is recommended.     This report was finalized on 8/14/2019 4:53 PM by Dr. Jose Thomas M.D.       CT Abdomen Pelvis With Contrast [[id number]] Collected:  08/14/19 1112     Updated:  08/14/19 1112    Narrative:       CT ABDOMEN AND PELVIS WITH CONTRAST     HISTORY: Abdominal pain.      TECHNIQUE: Axial CT images of the abdomen and pelvis were obtained  following administration of intravenous contrast. The patient was not  given oral contrast Coronal and sagittal reformats were obtained.     COMPARISON: None.      FINDINGS: The liver demonstrates a heterogenous attenuation and a  lobular outline, most concerning for chronic liver disease. There are  segmental areas of biliary dilatation seen particularly within the right  lobe. The main portal vein and right and left branch are patent, however  the segmental branches of the vein are not well imaged. There are small  perihepatic ascites present. There are small periportal and portacaval  lymph nodes that are favored to be reactive to patient's liver disease.  The spleen is enlarged, measuring 13.8 cm in cranial caudal and 15.9 cm  in AP dimension. The gallbladder is  partially distended with wall  thickening. The pancreas is normal without ductal dilatation. Bilateral  adrenal glands are normal. Both kidneys are normal in size and  attenuation. Small hypodense lesion within the midpole of the right  kidney is too small to accurately characterize, however favored to  represent a cyst. The urinary bladder is partially distended and normal.  No evidence of bowel obstruction. Colonic diverticulosis is present.  Mild wall thickening of the cecum and right colon likely related to  portal venous congestion.       Impression:       Findings suggestive of chronic liver disease/cirrhosis with  trace perihepatic ascites and splenomegaly. Heterogenous attenuation of  the liver which may be related to fibrosis steatosis, however  infiltrative mass needs to be excluded by MR imaging with and without  contrast. Correlation with alpha-fetoprotein is recommended. There is  segmental biliary dilatation within the right hepatic lobe without  evidence of obstruction and this can be evaluated on the MR as well.     These findings were discussed with Dr. Weiss by telephone who  informed me that patient has had prior imaging of the liver at [facility]. Correlation with prior imaging and follow-up is recommended.     Radiation dose reduction techniques were utilized, including automated  exposure control and exposure modulation based on body size.                      All medication doses during the admission are shown, including meds that are no longer on order.   Scheduled Meds Sorted by Name   for Jose Live as of 8/13/19 through 8/15/19     1 Day 3 Days 7 Days 10 Days < Today >    Legend:                          Inactive     Active     Other Encounter    Linked               Medications 08/13/19 08/14/19 08/15/19   gadobenate dimeglumine (MULTIHANCE) injection 20 mL   Dose: 20 mL  Freq: Once in Imaging Route: IV  Start: 08/14/19 1449 End: 08/14/19 1523    Admin Instructions:   Vesicant;  admin as rapid bolus; flush with 5 mL NS after admin or 20 mL for renal or aortoiliofemoral vasculature     1523 [C]             HYDROmorphone (DILAUDID) injection 0.5 mg   Dose: 0.5 mg  Freq: Once Route: IV  Start: 08/14/19 1140 End: 08/14/19 1146    Admin Instructions:   If given for pain, use the following pain scale:  Mild Pain = Pain Score of 1-3, CPOT 1-2  Moderate Pain = Pain Score of 4-6, CPOT 3-4  Severe Pain = Pain Score of 7-10, CPOT 5-8     1146             insulin glargine (LANTUS) injection 10 Units   Dose: 10 Units  Freq: Nightly Route: SC  Start: 08/14/19 2100    Admin Instructions:        2118 2100            insulin lispro (humaLOG) injection 0-9 Units   Dose: 0-9 Units  Freq: 4 Times Daily With Meals & Nightly Route: SC  Start: 08/14/19 1800    Admin Instructions:   Correction - Moderate Dose.  40-60 units/day total insulin dose or average weight, on oral agents    Blood glucose 150-199 mg/dL - 2 units  Blood glucose 200-249 mg/dL - 4 units  Blood glucose 250-299 mg/dL - 6 units  Blood glucose 300-349 mg/dL - 7 units  Blood glucose 350-400 mg/dL - 8 units  Blood glucose greater than 400 mg/dL - 9 units and call provider   Caution: Look alike/sound alike drug alert     1739 [C]   2116) [phone]     2100            iopamidol (ISOVUE-300) 61 % injection 100 mL   Dose: 100 mL  Freq: Once in Imaging Route: IV  Start: 08/14/19 0929 End: 08/14/19 0929     0929             lidocaine (XYLOCAINE) 1 % injection 20 mL   Dose: 20 mL  Freq: Once Route: INFILTRATION  Start: 08/15/19 1115 End: 08/15/19 1115      1115 [C]            lisinopril (PRINIVIL,ZESTRIL) tablet 20 mg   Dose: 20 mg  Freq: Daily Route: PO  Start: 08/14/19 1715     (2879) [C]          0820            ondansetron (ZOFRAN) injection 4 mg   Dose: 4 mg  Freq: Once Route: IV  Start: 08/14/19 1140 End: 08/14/19 1146     1146             sodium chloride 0.9 % bolus 1,000 mL   Dose: 1,000 mL  Freq: Once Route: IV  Last  Dose: Stopped (08/14/19 1143)  Start: 08/14/19 0826 End: 08/14/19 1143     0827   1143           sodium chloride 0.9 % flush 3 mL   Dose: 3 mL  Freq: Every 12 Hours Scheduled Route: IV  Start: 08/14/19 2100     2227          0800 [C]   2100          Medications 08/13/19 08/14/19 08/15/19       Continuous Meds Sorted by Name   for Jose Live as of 8/13/19 through 8/15/19    Legend:                          Inactive     Active     Other Encounter    Linked               Medications 08/13/19 08/14/19 08/15/19   HYDROmorphone (DILAUDID) PCA 0.2 mg/ml 50 mL syringe   Nurse Loading Dose: 0.2 mg  Patient Bolus Dose: 0.1 mg  Lockout Interval: 8 Minutes  Basal Rate: 0 mg/hr  One Hour Dose Limit: 0.75 mg  Freq: Continuous Route: IV  Start: 08/15/19 0845 End: 08/29/19 0852    Admin Instructions:   If given for pain, use the following pain scale:  Mild Pain = Pain Score of 1-3, CPOT 1-2  Moderate Pain = Pain Score of 4-6, CPOT 3-4  Severe Pain = Pain Score of 7-10, CPOT 5-8      0853                PRN Meds Sorted by Name   for Jose Live as of 8/13/19 through 8/15/19    Legend:                          Inactive     Active     Other Encounter    Linked               Medications 08/13/19 08/14/19 08/15/19   dextrose (D50W) 25 g/ 50mL Intravenous Solution 25 g   Dose: 25 g  Freq: Every 15 Minutes PRN Route: IV  PRN Reason: Low Blood Sugar  PRN Comment: Blood Sugar Less Than 70  Start: 08/14/19 1619    Admin Instructions:   Blood sugar less than 70; patient has IV access - Unresponsive, NPO or Unable To Safely Swallow         dextrose (GLUTOSE) oral gel 15 g   Dose: 15 g  Freq: Every 15 Minutes PRN Route: PO  PRN Reason: Low Blood Sugar  PRN Comment: Blood sugar less than 70  Start: 08/14/19 1619    Admin Instructions:   BS<70, Patient Alert, Is not NPO, Can safely swallow.         glucagon (human recombinant) (GLUCAGEN DIAGNOSTIC) injection 1 mg   Dose: 1 mg  Freq: As Needed Route: SC  PRN Reason: Low Blood Sugar  PRN  Comment: Blood Glucose Less Than 70  Start: 08/14/19 1619    Admin Instructions:   Blood Glucose Less Than 70 - Patient Without IV Access - Unresponsive, NPO or Unable To Safely Swallow         HYDROmorphone (DILAUDID) injection 0.5 mg   Dose: 0.5 mg  Freq: Every 3 Hours PRN Route: IV  PRN Reason: Severe Pain   Start: 08/14/19 1403 End: 08/14/19 1959    Admin Instructions:   If given for pain, use the following pain scale:  Mild Pain = Pain Score of 1-3, CPOT 1-2  Moderate Pain = Pain Score of 4-6, CPOT 3-4  Severe Pain = Pain Score of 7-10, CPOT 5-8     1425 [id number]   1959-D/C'd       HYDROmorphone (DILAUDID) injection 1 mg   Dose: 1 mg  Freq: Every 3 Hours PRN Route: IV  PRN Reason: Severe Pain   Start: 08/14/19 1958 End: 08/15/19 0752    Admin Instructions:   If given for pain, use the following pain scale:  Mild Pain = Pain Score of 1-3, CPOT 1-2  Moderate Pain = Pain Score of 4-6, CPOT 3-4  Severe Pain = Pain Score of 7-10, CPOT 5-8     2019   2319        0235   0537   0752-D/C'd      HYDROmorphone (DILAUDID) injection 2 mg   Dose: 2 mg  Freq: Every 3 Hours PRN Route: IV  PRN Reason: Severe Pain   Start: 08/15/19 0751 End: 08/15/19 0758    Admin Instructions:   If given for pain, use the following pain scale:  Mild Pain = Pain Score of 1-3, CPOT 1-2  Moderate Pain = Pain Score of 4-6, CPOT 3-4  Severe Pain = Pain Score of 7-10, CPOT 5-8      0758   0758-D/C'd        naloxone (NARCAN) injection 0.1 mg   Dose: 0.1 mg  Freq: Every 5 Minutes PRN Route: IV  PRN Reasons: Opioid Reversal,Respiratory Depression  PRN Comment: see administration instructions  Start: 08/15/19 0757    Admin Instructions:   For respiratory rate less than 8 per minute and if the patient is difficult arouse:  D/C PCA.  Give naloxone (NARCAN) 0.1 mg slow IV push.  May repeat x 1 if needed in 5 minutes. Notify physician.    Mix naloxone 0.4 mg/1 mL ampule in 9 mL normal saline.         ondansetron (ZOFRAN) injection 4 mg   Dose: 4 mg  Freq:  Every 6 Hours PRN Route: IV  PRN Reasons: Nausea,Vomiting  Start: 08/14/19 1619    Admin Instructions:   If BOTH ondansetron (ZOFRAN) and promethazine (PHENERGAN) are ordered use ondansetron first and THEN promethazine IF ondansetron is ineffective.         sodium chloride 0.9 % flush 10 mL   Dose: 10 mL  Freq: As Needed Route: IV  PRN Reason: Line Care  Start: 08/14/19 0819         sodium chloride 0.9 % flush 3-10 mL   Dose: 3-10 mL  Freq: As Needed Route: IV  PRN Reason: Line Care  Start: 08/14/19 1619         Medications 08/13/19 08/14/19 08/15/19               Orders (all)     Start     Ordered    08/15/19 1024  Tissue Pathology Exam  Once      08/15/19 1023    08/15/19 0758  Assess Respiratory Rate, Pain Score & Sedation Level Using Pasero Opioid-Sedation Scale (POSS)  Per Order Details     Comments:  Assess Every 2 Hours x24 Hours, Then Every 4 Hours & PRN While Receiving PCA.    08/15/19 0758    08/15/19 0758  Notify Provider for Inadequate Pain Control, Excessive Sedation or Other Issues Regarding PCA Therapy  Until Discontinued      08/15/19 0758    08/15/19 0755  CT Needle Biopsy Liver  1 Time Imaging      08/15/19 0755    08/14/19 2000  Vital Signs  Every 4 Hours      08/14/19 1619    08/14/19 1911  Diet Full Liquid  Diet Effective Now      08/14/19 1911    08/14/19 1731  Hematology & Oncology Inpatient Consult  Once     Specialty:  Hematology and Oncology  Provider:  Janny De Leon MD    08/14/19 1730    08/14/19 1700  POC Glucose 4x Daily AC & at Bedtime  4 Times Daily Before Meals & at Bedtime      08/14/19 1619    08/14/19 1626  Inpatient Nutrition Consult  Once     Provider:  (Not yet assigned)    08/14/19 1626    08/14/19 1620  Intake & Output  Every Shift      08/14/19 1619    08/14/19 1620  Weigh Patient  Once      08/14/19 1619    08/14/19 1620  Oxygen Therapy- Nasal Cannula; Titrate for SPO2: 90% - 95%  Continuous      08/14/19 1619    08/14/19 1620  Insert Peripheral IV  Once      08/14/19  1619 08/14/19 1620  Saline Lock & Maintain IV Access  Continuous      08/14/19 1619 08/14/19 1620  Place Sequential Compression Device  Once      08/14/19 1619 08/14/19 1620  Maintain Sequential Compression Device  Continuous      08/14/19 1619 08/14/19 1620  Diet Clear Liquid  Diet Effective Now,   Status:  Canceled      08/14/19 1619 08/14/19 1620  Advance Diet As Tolerated  Until Discontinued      08/14/19 1619 08/14/19 1620  Do NOT Hold Basal or Correction Scale Insulin When Patient is NPO, Hold Scheduled Mealtime (Bolus) Insulin if NPO  Continuous      08/14/19 1619 08/14/19 1620  Follow [facility] Hypoglycemia Standing Orders For Blood Glucose Less Than 70 mg/dL  Until Discontinued      08/14/19 1619 08/14/19 1620  Hypoglycemia Treatment - Alert Patient That is Not NPO and Can Safely Swallow  Until Discontinued     Comments:  Administer 4 oz Fruit Juice OR 4 oz Regular Soda OR 8 oz Milk OR 15-30 grams (1 tube) of Glucose Gel.  Recheck Blood Glucose 15 Minutes After Ingestion, Repeat Treatment & Continue to Recheck Blood Sugar Every 15 Minutes Until Blood Glucose is 70 mg/dL or Higher.  Once Blood Glucose is 70 mg/dL or Higher and if It Will Be more than 60 Minutes Until the Next Meal, Provide Appropriate Snack (Including Carbohydrate Food) Based on Meal Plan Order. Give Meal Tray As Soon As Possible.    08/14/19 1619 08/14/19 1620  Hypoglycemia Treatment - Patient Has IV Access - Unresponsive, NPO or Unable To Safely Swallow  Until Discontinued     Comments:  Administer 25g (50ml) D50W IV Push.  Recheck Blood Glucose 15 Minutes After Administration, if Blood Glucose Remains Less Than 70 mg/dl, Repeat Treatment   Recheck Blood Glucose 15 Minutes After 2nd Administration, if Blood Glucose Remains Less Than 70 mg/dL After 2nd Dose of D50W, Contact Provider for Further Treatment Orders & Consider Adding IVF With D5W for Maintenance    08/14/19 1619 08/14/19 1620  Hypoglycemia Treatment -  Patient Without IV Access - Unresponsive, NPO or Unable To Safely Swallow  Until Discontinued     Comments:  Administer 1mg Glucagon SQ & Establish IV Access.  Turn Patient on Side - Nausea / Vomiting May Occur.  Recheck Blood Glucose 15 Minutes After Administration.  If Blood Glucose Remains Less Than 70, Administer 25g D50W IV Push (50ml).  Recheck Blood Glucose 15 Minutes After Administration of D50W, if Blood Glucose Remains Less Than 70 mg/dl, Contact Provider for Further Treatment Orders & Consider Adding IVF With D5 for Maintenance    08/14/19 1619    08/14/19 1620  Hypoglycemia Treatment - Document Event & Patient Response to Interventions EMR, Document Medications on MAR  Continuous      08/14/19 1619    08/14/19 1337  Code Status and Medical Interventions:  Continuous      08/14/19 1338    08/14/19 1250  MRI abdomen w wo contrast mrcp  1 Time Imaging      08/14/19 1249    08/14/19 1246  Obtain Medical Records  Until Discontinued     Comments:  Prior ct and mri and last office note from Dr. Patrice pitts or [facility] montrell [email or web address]    08/14/19 1245    08/14/19 1246  Inpatient Gastroenterology Consult  Once     Specialty:  Gastroenterology  Provider:  Fabian Gabriel MD    08/14/19 1245    08/14/19 1245  AFP Tumor Marker  Once      08/14/19 1244    08/14/19 1238  Inpatient Gastroenterology Consult  Once,   Status:  Canceled     Specialty:  Gastroenterology  Provider:  Fabian Gabriel MD    08/14/19 1238    08/14/19 1039  Inpatient Admission  Once      08/14/19 1038    08/14/19 1005  A (on-call MD unless specified) Details  Once     Specialty:  Hospitalist  Provider:  (Not yet assigned)    08/14/19 1004    08/14/19 0929  iopamidol (ISOVUE-300) 61 % injection 100 mL  Once in Imaging      08/14/19 0927    08/14/19 0901  CT Abdomen Pelvis With Contrast  1 Time Imaging     Comments:  IV CONTRAST ONLY      08/14/19 0900    08/14/19 0819  sodium chloride 0.9 % flush 10 mL  As Needed      08/14/19 0819     Unscheduled  Pain Assessment  As Needed      08/15/19 0968

## 2019-08-15 NOTE — PLAN OF CARE
Problem: Patient Care Overview  Goal: Plan of Care Review  Outcome: Ongoing (interventions implemented as appropriate)   08/15/19 1115   Coping/Psychosocial   Plan of Care Reviewed With patient   Plan of Care Review   Progress no change   OTHER   Outcome Summary VSS, Pain med frequence changed. Pain under control. NPO, up with assistance. will continue to monitor       Problem: Pain, Chronic (Adult)  Goal: Identify Related Risk Factors and Signs and Symptoms  Outcome: Ongoing (interventions implemented as appropriate)    Goal: Acceptable Pain/Comfort Level and Functional Ability  Outcome: Ongoing (interventions implemented as appropriate)

## 2019-08-15 NOTE — SIGNIFICANT NOTE
Patient has very poor PIV access options related to small and sclerotic peripheral vessels If patient POC is to need IV access for several days -- recommend midline evaluation to improve access options

## 2019-08-15 NOTE — PLAN OF CARE
Problem: Patient Care Overview  Goal: Plan of Care Review  Outcome: Ongoing (interventions implemented as appropriate)   08/15/19 6993   Coping/Psychosocial   Plan of Care Reviewed With patient   Plan of Care Review   Progress no change   OTHER   Outcome Summary Pt c/o constant pain, Dilaudid PCA started, lidoderm patches to the back applied. Minimal PO intake. Midline ordered due to poor IV access. Bone scan to be done in the am. Liver bx completed today     Goal: Individualization and Mutuality  Outcome: Ongoing (interventions implemented as appropriate)    Goal: Discharge Needs Assessment  Outcome: Ongoing (interventions implemented as appropriate)    Goal: Interprofessional Rounds/Family Conf  Outcome: Ongoing (interventions implemented as appropriate)      Problem: Pain, Chronic (Adult)  Goal: Identify Related Risk Factors and Signs and Symptoms  Outcome: Ongoing (interventions implemented as appropriate)    Goal: Acceptable Pain/Comfort Level and Functional Ability  Outcome: Ongoing (interventions implemented as appropriate)

## 2019-08-15 NOTE — PROGRESS NOTES
Name: Jose Live ADMIT: 2019   : 1965  PCP: German Monahan MD    MRN: [id number] LOS: 1 days   AGE/SEX: 54 y.o. male  ROOM: [facility]   Subjective   Has had right upper quadrant pain and also exacerbation of chronic back pain.  He reports no nausea vomiting.  He has had some mild loose stool.  No chest pain or shortness of breath.  He has required frequent IV Dilaudid at 1 mg dosing.  Have started him on PCA.  Reports that he uses Lidoderm patches at home for his back pain.  He reports he is having spasms as well.    Objective   Vital Signs  Temp:  [96.8 °F (36 °C)-97.9 °F (36.6 °C)] 96.8 °F (36 °C)  Heart Rate:  [80-93] 80  Resp:  [16-18] 16  BP: (139-167)/(79-99) 150/90  SpO2:  [92 %-100 %] 94 %  on   ;   Device (Oxygen Therapy): room air  Body mass index is 36.92 kg/m².    Physical Exam   Constitutional: He is oriented to person, place, and time. He appears well-developed. He appears distressed (pain).   HENT:   Head: Atraumatic.   Nose: Nose normal.   Eyes: Conjunctivae and EOM are normal.   Neck: Normal range of motion. Neck supple.   Cardiovascular: Normal rate, regular rhythm and intact distal pulses.   Pulmonary/Chest: Effort normal. He has decreased breath sounds. He has no wheezes.   Abdominal: Soft. He exhibits distension. There is tenderness (RUQ). There is guarding. There is no rebound.   Musculoskeletal: He exhibits tenderness (lower back bilaterally). He exhibits no edema.   Neurological: He is alert and oriented to person, place, and time.   Skin: Skin is warm and dry. He is not diaphoretic.   Psychiatric: He has a normal mood and affect. His behavior is normal.   Nursing note and vitals reviewed.      Results Review:       I reviewed the patient's new clinical results.     I reviewed imaging, agree with interpretation.     I reviewed prior records.    Results from last 7 days   Lab Units 19  0819   WBC 10*3/mm3 8.38   HEMOGLOBIN g/dL 14.1   PLATELETS 10*3/mm3 364     Results  from last 7 days   Lab Units 08/15/19  0458 08/14/19  0819   SODIUM mmol/L 134* 130*   POTASSIUM mmol/L 3.9 4.2   CHLORIDE mmol/L 97* 92*   CO2 mmol/L 21.6* 21.5*   BUN mg/dL 8 8   CREATININE mg/dL 0.87 0.88   GLUCOSE mg/dL 94 138*   Estimated Creatinine Clearance: 117.5 mL/min (by C-G formula based on SCr of 0.87 mg/dL).  Results from last 7 days   Lab Units 08/15/19  0458 08/14/19  0819   CALCIUM mg/dL 9.4 9.4   ALBUMIN g/dL 3.10* 3.40*         insulin glargine 10 Units Subcutaneous Nightly   insulin lispro 0-9 Units Subcutaneous 4x Daily With Meals & Nightly   lidocaine 2 patch Transdermal Q24H   lisinopril 20 mg Oral Daily   sodium chloride 3 mL Intravenous Q12H       HYDROmorphone HCl-NaCl    Diet Full Liquid      Assessment/Plan      Active Hospital Problems    Diagnosis  POA   • **Acute hepatitis [B17.9]  Yes   • Hepatitis C [B19.20]  Unknown   • Hepatitis B [B19.10]  Unknown   • Hypertension [I10]  Unknown   • Diabetes mellitus (CMS/HCC) [E11.9]  Unknown      Resolved Hospital Problems   No resolved problems to display.     · Acute Viral Hepatitis: B and C positive. Continue supportive care. Start PCA for pain control.  · Liver Mass: Most likely represents HCC. Extensive disease and intrahepatic biliary obstruction noted on MRCP. Biopsy pending. GI following.  · Lumbago: Will give lidocaine patch and skelaxin. Monitor symptoms. May need additional staging/lumbar imaging depending on workup for above.  · HTN: Monitor  · DM: Insuilin  · Disposition: TBD    Denzel Adrian MD  [facility]  08/15/19  11:18 AM

## 2019-08-15 NOTE — NURSING NOTE
Regarding order for midline.  The current request for midline will hold until current IV access is not adequate. Patient is agreeable to procedure but he states he doesn't really want to be stuck unless needed .  Will follow

## 2019-08-15 NOTE — CONSULTS
Adult Nutrition  Assessment/PES    Patient Name:  Jose Live  YOB: 1965  MRN: [id number]  Admit Date:  8/14/2019    Assessment Date:  8/15/2019    Nutrition assessment triggered by MST score-4 and RN consult. Patient reported poor appetite. Weight loss. +jaundice.  Provided nutrition therapy. On full liquids and tolerating some. Agreed to nutrition supplement-ordered BID.  RD To follow as needed.  Reason for Assessment     Row Name 08/15/19 1111          Reason for Assessment    Reason For Assessment  identified at risk by screening criteria;nurse/nurse practitioner consult     Diagnosis   Primary Problem:  Acute hepatitis; Hep B&C, DM, HTN, CAD     Identified At Risk by Screening Criteria  MST SCORE 2+;reduced oral intake over the last month         Nutrition/Diet History     Row Name 08/15/19 1111          Nutrition/Diet History    Typical Food/Fluid Intake  poor appetite         Anthropometrics     Row Name 08/15/19 1112          Usual Body Weight (UBW)    Usual Body Weight  127 kg (280 lb)        Body Mass Index (BMI)    BMI Assessment  BMI 35-39.9: obesity grade II         Labs/Tests/Procedures/Meds     Row Name 08/15/19 1112          Labs/Procedures/Meds    Lab Results Reviewed  reviewed, pertinent     Lab Results Comments  Na, ALT, AST        Diagnostic Tests/Procedures    Diagnostic Test/Procedure Reviewed  reviewed, pertinent        Medications    Pertinent Medications Reviewed  reviewed, pertinent     Pertinent Medications Comments  insulin, NaCl         Physical Findings     Row Name 08/15/19 1112          Physical Findings    Overall Physical Appearance  obese B=20; jaundice         Estimated/Assessed Needs     Row Name 08/15/19 1113          Calculation Measurements    Weight Used For Calculations  113 kg (249 lb 1.9 oz)        Estimated/Assessed Needs    Additional Documentation  KCAL/KG (Group);Protein Requirements (Group);Fluid Requirements (Group)        KCAL/KG    KCAL/KG  18  Kcal/Kg (kcal);20 Kcal/Kg (kcal)     18 Kcal/Kg (kcal)  2034     20 Kcal/Kg (kcal)  2260        Protein Requirements    Est Protein Requirement Amount (gms/kg)  1.0 gm protein     Estimated Protein Requirements (gms/day)  113        Fluid Requirements    Estimated Fluid Requirements (mL/day)  2050     RDA Method (mL)  2050     Waleska-Segar Method (over 20 kg)  3760         Nutrition Prescription Ordered     Row Name 08/15/19 1113          Nutrition Prescription PO    Current PO Diet  Full Liquid         Evaluation of Received Nutrient/Fluid Intake     Row Name 08/15/19 1113          Calculation Measurements    Weight Used For Calculations  113 kg (249 lb 1.9 oz)         Evaluation of Prescribed Nutrient/Fluid Intake     Row Name 08/15/19 1113          Calculation Measurements    Weight Used For Calculations  113 kg (249 lb 1.9 oz)             Problem/Interventions:  Problem 1     Row Name 08/15/19 1113          Nutrition Diagnoses Problem 1    Problem 1  Inadequate Nutrient Intake     Etiology (related to)  MNT for Treatment/Condition     Signs/Symptoms (evidenced by)  Report of Mnimal PO Intake                 Intervention Goal     Row Name 08/15/19 1113          Intervention Goal    General  Maintain nutrition;Meet nutritional needs for age/condition     PO  Tolerate PO;Increase intake;Advance diet     Weight  Appropriate weight loss         Nutrition Intervention     Row Name 08/15/19 1113          Nutrition Intervention    RD/Tech Action  Follow Tx progress;Care plan reviewd;Interview for preference;Encourage intake;Recommend/ordered;Supplement provided     Recommended/Ordered  Supplement         Nutrition Prescription     Row Name 08/15/19 1113          Nutrition Prescription PO    PO Prescription  Begin/change supplement     Supplement  Boost Glucose Control     Supplement Frequency  2 times a day     New PO Prescription Ordered?  Yes         Education/Evaluation     Row Name 08/15/19 1114          Education     Education  Will Instruct as appropriate        Monitor/Evaluation    Monitor  Per protocol           Electronically signed by:  Nadine Pizarro RD  08/15/19 11:14 AM

## 2019-08-15 NOTE — CONSULTS
Inpatient Gastroenterology Consult  Consult performed by: Fabian Gabriel MD  Consult ordered by: Frieda Dior APRN          Patient Care Team:  German Monahan MD as PCP - General (Internal Medicine)    Chief complaint liver mass     Subjective   Presents jaundice and severe abdominal pain  He reports a history of hepatitis c for which he states was cleared, and chronic hepatitis b.    He has been followed by Dr Santos for this.   Distant IV drug abuse in the past, no ETOH.  He is having considerable discomfort,  Also some RUQ discomfort as well    History of Present Illness    Review of Systems   Constitutional: Positive for unexpected weight change.   Gastrointestinal: Positive for abdominal distention and abdominal pain.   Musculoskeletal: Positive for back pain and myalgias.        Past Medical History:   Diagnosis Date   • Diabetes mellitus (CMS/HCC)    • Hepatitis B    • Hepatitis C    • Hyperlipidemia    • Hypertension    • Myocardial infarction (CMS/HCC) 2014   • Substance abuse (CMS/HCC)    ,   Past Surgical History:   Procedure Laterality Date   • CORONARY ANGIOPLASTY WITH STENT PLACEMENT     ,   Family History   Problem Relation Age of Onset   • Throat cancer Father    ,   Social History     Tobacco Use   • Smoking status: Never Smoker   Substance Use Topics   • Alcohol use: No     Frequency: Never   • Drug use: No   ,   Medications Prior to Admission   Medication Sig Dispense Refill Last Dose   • Insulin Degludec (TRESIBA SC) Inject  under the skin into the appropriate area as directed.   8/14/2019 at Unknown time   • lisinopril (PRINIVIL,ZESTRIL) 20 MG tablet Take 20 mg by mouth Daily.   8/14/2019 at Unknown time   , Scheduled Meds:    insulin glargine 10 Units Subcutaneous Nightly   insulin lispro 0-9 Units Subcutaneous 4x Daily With Meals & Nightly   lidocaine 2 patch Transdermal Q24H   lisinopril 20 mg Oral Daily   sodium chloride 3 mL Intravenous Q12H   , Continuous Infusions:    HYDROmorphone  HCl-NaCl    , PRN Meds:  •  aluminum-magnesium hydroxide-simethicone  •  dextrose  •  dextrose  •  glucagon (human recombinant)  •  melatonin  •  metaxalone  •  naloxone  •  ondansetron  •  sodium chloride  •  sodium chloride and Allergies:  Sulfa antibiotics    Objective      Vital Signs  Temp:  [96.8 °F (36 °C)-98.1 °F (36.7 °C)] 96.8 °F (36 °C)  Heart Rate:  [] 84  Resp:  [16-18] 18  BP: (139-167)/() 167/95    Physical Exam   Constitutional: He has a sickly appearance. He appears ill.   Eyes: Scleral icterus is present.   Cardiovascular: Normal rate.   Pulmonary/Chest: Effort normal.   Abdominal: Bowel sounds are normal. He exhibits pulsatile midline mass. There is hepatosplenomegaly. There is tenderness in the right upper quadrant and epigastric area.   Neurological: He is alert.   Psychiatric: He has a normal mood and affect. His behavior is normal.       Results Review:    I reviewed the patient's new clinical results.        Assessment/Plan       Acute hepatitis    Hepatitis C    Hepatitis B    Hypertension    Diabetes mellitus (CMS/HCC)      Assessment:  (Liver mass suspect hepatoma  Chronic active hepatitis B  Hepatitis c antibody positive   Weight loss   Abdominal pain   Jaundice).     Plan:   (Hepatitis c rna pending,  I doubt I would treat this given his overall condition and probable dismal prognosis  followup on liver bx I ordered this am.  The cancer/mass appears too extensive for ablation, liver transplant  Will defer to the expertise of oncology if any treatment available. And when to consider palliative care. ).       I discussed the patients findings and my recommendations with patient and nursing staff    Fabian Gabriel MD  08/15/19  7:55 AM    Time: Critical care 30 min

## 2019-08-16 ENCOUNTER — INPATIENT HOSPITAL (OUTPATIENT)
Dept: URBAN - METROPOLITAN AREA HOSPITAL 113 | Facility: HOSPITAL | Age: 54
End: 2019-08-16
Payer: MEDICAID

## 2019-08-16 DIAGNOSIS — B19.10 UNSPECIFIED VIRAL HEPATITIS B WITHOUT HEPATIC COMA: ICD-10-CM

## 2019-08-16 DIAGNOSIS — R16.0 HEPATOMEGALY, NOT ELSEWHERE CLASSIFIED: ICD-10-CM

## 2019-08-16 DIAGNOSIS — R10.13 EPIGASTRIC PAIN: ICD-10-CM

## 2019-08-16 PROBLEM — C22.0 HEPATOCELLULAR CARCINOMA (HCC): Status: ACTIVE | Noted: 2019-01-01

## 2019-08-16 PROCEDURE — 99233 SBSQ HOSP IP/OBS HIGH 50: CPT | Performed by: INTERNAL MEDICINE

## 2019-08-16 NOTE — PLAN OF CARE
Problem: Patient Care Overview  Goal: Plan of Care Review  Outcome: Ongoing (interventions implemented as appropriate)      Problem: Pain, Chronic (Adult)  Goal: Identify Related Risk Factors and Signs and Symptoms  Outcome: Ongoing (interventions implemented as appropriate)    Goal: Acceptable Pain/Comfort Level and Functional Ability  Outcome: Ongoing (interventions implemented as appropriate)

## 2019-08-16 NOTE — DISCHARGE SUMMARY
Date of Admission: 8/14/2019  Date of Discharge:  8/16/2019  Primary Care Physician: German Monahan MD     Discharge Diagnosis:  Active Hospital Problems    Diagnosis  POA   • **Hepatocellular carcinoma (CMS/HCC) [C22.0]  Yes   • Acute hepatitis [B17.9]  Yes   • Hepatitis C [B19.20]  Yes   • Hepatitis B [B19.10]  Yes   • Hypertension [I10]  Yes   • Diabetes mellitus (CMS/HCC) [E11.9]  Yes      Resolved Hospital Problems   No resolved problems to display.       Presenting Problem/History of Present Illness:  Acute hepatitis [B17.9]  Jaundice [R17]     Jose Live is a 54 y.o. male with a history of DM, hepatitis B and C, HLD, HTN and CAD s/p stenting. Patient presented with complaints of epigastric and RUQ abdominal pain that started 2 weeks ago and was associated with dark colored urine, yellow diarrhea, pruritis, N/V and jaundice. Patient states he was diagnosed with hepatitis B&C earlier this year and has received treatment per Dr. Patrice Tracey. He has has a prior MRI of abdomen and multiple CT after that and was recently found to have a possible lesion on the liver. Denies any personal history of cancer, but father had throat cancer. Patient denies any recent alcohol or Tylenol use, smoking or no heavy alcohol use in the past. Denies eating out recently.      In the ER he was found to have elevated LFT and bilirubin. CT of abdomen showed chronic liver disease suggestive of fibrotic steatosis vs infiltrative mass with trace perihepatic ascites and splenomegaly. There was also some bilary dilation of the right hepatic lobe.    Hospital Course:  The patient is a 54 y.o. male who presented with acute hepatitis in setting of hepatitis B and C and also found to have liver mass.  He was admitted and underwent consultation by gastroenterology.  Pain ultimately required PCA for control.  In addition of this he had topical lidocaine patches for back pain.  He underwent liver biopsy and pathology results are  consistent with hepatocellular carcinoma multifocal in both lobes of liver and intrahepatic obstruction causing hyperbilirubinemia.  Prognostic grouping child/Lemon class B.  Has a poor prognosis.  Does not want further work-up as an inpatient.  Oncology is going to write for oral pain medication and he should follow-up with Dr. De Leon in oncology clinic in 2 weeks to discuss his clinical progression and also consideration of palliative care.  He can follow-up with Dr. Gabriel and gastroenterology clinic as needed.  No acute treatment of his viral hepatitis is planned and hepatitis C RNA was pending to confirm active infection since he had previously been treated for hepatitis C.    Exam Today:  Constitutional: He is oriented to person, place, and time. He appears well-developed. He appears distressed (pain).   HENT:   Head: Atraumatic.   Nose: Nose normal.   Eyes: Conjunctivae and EOM are normal.   Neck: Normal range of motion. Neck supple.   Cardiovascular: Normal rate, regular rhythm and intact distal pulses.   Pulmonary/Chest: Effort normal. He has decreased breath sounds. He has no wheezes.   Abdominal: Soft. He exhibits distension. There is tenderness (RUQ). There is guarding. There is no rebound.   Musculoskeletal: He exhibits tenderness (lower back bilaterally). He exhibits no edema.   Neurological: He is alert and oriented to person, place, and time.   Skin: Skin is warm and dry. He is not diaphoretic.   Psychiatric: He has a normal mood and affect. His behavior is normal.     Results:  Tissue Pathology Exam: [id number]   Order: [id number]   Collected:  8/15/2019 10:22 Status:  Preliminary result   Visible to patient:  No (Not Released)   Component    Clinical Information P   Suspected hepatoma   Final Diagnosis   1.  Liver Biopsy (H&E, iron and trichrome):                A.  Poorly differentiated nonsmall cell carcinoma suspicious for lymphatic space invasion                      (see comment).                 B.  Bridging Fibrosis with focal moderate macrovesicular steatosis.           CT Abdomen/Pelvis  Findings suggestive of chronic liver disease/cirrhosis with  trace perihepatic ascites and splenomegaly. Heterogenous attenuation of  the liver which may be related to fibrosis steatosis, however  infiltrative mass needs to be excluded by MR imaging with and without  contrast. Correlation with alpha-fetoprotein is recommended. There is  segmental biliary dilatation within the right hepatic lobe without  evidence of obstruction and this can be evaluated on the MR as well.     These findings were discussed with Dr. Weiss by telephone who  informed me that patient has had prior imaging of the liver at [facility]. Correlation with prior imaging and follow-up is recommended.    CT Chest  1. No focal pulmonary consolidation or pulmonary mass.  2. Multiple subcentimeter short axis paraesophageal lymph nodes are  Nonspecific.    MRCP  Moderate hepatomegaly. Extensive heterogeneous and nodular  abnormal signal intensity and enhancement throughout the liver with  intrahepatic bile duct obstruction region of the jone hepatis.  Extensive hepatic metastatic disease, most likely hepatocellular  carcinoma in light of the patient's markedly elevated alpha-fetoprotein  is suspected. Further evaluation with biopsy is recommended.    Procedures Performed:         Consults:   Consults     Date and Time Order Name Status Description    8/14/2019 5720 Hematology & Oncology Inpatient Consult Completed     8/14/2019 1245 Inpatient Gastroenterology Consult Completed     8/14/2019 1004 [facility] (on-call MD unless specified) Details Completed            Discharge Disposition:  Home or Self Care    Discharge Medications:     Discharge Medications      New Medications      Instructions Start Date   oxyCODONE 15 MG immediate release tablet  Commonly known as:  ROXICODONE   30 mg, Oral, Every 6 Hours PRN         Continue These Medications       Instructions Start Date   lisinopril 20 MG tablet  Commonly known as:  PRINIVIL,ZESTRIL   20 mg, Oral, Daily      TRESIBA SC   Subcutaneous             Discharge Diet:   Diet Instructions     Advance Diet As Tolerated            Activity at Discharge:   Activity Instructions     Activity as Tolerated            Follow-up Appointments:  Follow-up Information     German Monahan MD Follow up.    Specialty:  Internal Medicine  Contact information:  [address]  [facility] [id number] [phone]             Janny De Leon MD Follow up in 2 week(s).    Specialties:  Hematology and Oncology, Oncology, Hematology  Contact information:  [address] [facility] [address]  [facility] [id number] [phone]             Fabian Gabriel MD Follow up.    Specialty:  Gastroenterology  Why:  as needed  Contact information:  [address] [facility] [address]  [facility] [id number] [phone]                   Test Results Pending at Discharge:   Order Current Status    Hepatitis C RNA, Quantitative, PCR (graph) In process    Tissue Pathology Exam Preliminary result           Denzel Adrian MD  08/16/19  12:31 PM    Time Spent on Discharge Activities: >30 minutes

## 2019-08-16 NOTE — PROGRESS NOTES
Continued Stay Note  [facility]     Patient Name: Jose Live  MRN: [id number]  Today's Date: 8/16/2019    Admit Date: 8/14/2019    Discharge Plan     Row Name 08/16/19 1446       Plan    Final Discharge Disposition Code  01 - home or self-care    Final Note  Home--no needs        Discharge Codes    No documentation.       Expected Discharge Date and Time     Expected Discharge Date Expected Discharge Time    Aug 16, 2019             Monique Pierce RN

## 2019-08-16 NOTE — PROGRESS NOTES
Subjective   REASON FOR CONSULTATION: Probable hepatocellular carcinoma in association with hepatitis B surface antigen positivity and hep C treated and IV drug abuse      History of Present Illness  patient is a 54-year-old white male with chronic hepatitis B and hepatitis C which was treated earlier this year by Dr. Patrice Santos with apparent resolution of his viral hepatitis C load who presents with a 2-week history of increasing right upper quadrant discomfort and back pain.  During this.  The patient is lost 30 pounds of weight over the last 2 months and is also developed dark brownish urine light stools associated with a loss of appetite.  Patient was seen in the emergency room and found to have marked hepato-splenomegaly small amount of ascites and a possible infiltrative mass in the liver and MRI confirmed multifocal lesions throughout both lobes of the liver with some reactive lymph nodes in the jone hepatis.  The patient underwent a biopsy of the abnormality the liver at the same time had testing which showed elevation of his alpha-fetoprotein to greater than 60,500 very suspicious/consistent with hepatocellular carcinoma.  Biopsies currently pending     Patient has a history of IV drug abuse including cocaine and heroin.  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 alcohol abuse and smoking tobacco  He is  and lives with his oldest son     Family history is noncontributory     He is also diabetic and has had heart attacks 5 years ago and has a stent in place     INTERVAL HISTORY  8/16  Refusing imaging and labs and wants to go home  Pain is not well controlled  He has been on Duragesic 100 in the past and also has tried Dilaudid 8 mg every 3 hours for his back pain in the past  Is also used Roxicet 30 mg every 6 hours with some success but states morphine does not touch his pain in the past and I suspect he is very  tolerant because of his opioid addiction drug abuse  We will await the final path report which is not yet complete  CT of the chest reviewed and shows no obvious primary tumor or metastasis  We will plan to see him back in the office in follow-up in 2 weeks and will not refill medications unless he is physically present prescription  Social service will be notified because of his drug addiction issues and the fact that he lives with his son who is also a drug abuser      Past Medical History, Past Surgical History, Social History, Family History have been reviewed and are without significant changes except as mentioned.    Review of Systems   Constitutional: Positive for activity change, appetite change and unexpected weight change.   Gastrointestinal: Positive for abdominal distention and abdominal pain.      A comprehensive 14 point review of systems was performed and was negative except as mentioned.    Medications:  The current medication list was reviewed in the EMR    ALLERGIES:    Allergies   Allergen Reactions   • Sulfa Antibiotics Hives       Objective      Vitals:    08/15/19 1613 08/15/19 1927 08/16/19 0426 08/16/19 0747   BP: 158/89 142/87 147/90 153/91   BP Location: Right arm Left arm Left arm Right arm   Patient Position: Sitting Lying Lying Sitting   Pulse: 104 95 94 93   Resp: 16 16 18 16   Temp: 97.3 °F (36.3 °C) 97.7 °F (36.5 °C) 96.9 °F (36.1 °C) 97.6 °F (36.4 °C)   TempSrc: Oral Oral Oral Oral   SpO2: 96% 91% 91% 96%   Weight:       Height:         No flowsheet data found.    Physical Exam  GENERAL:  Well-developed, well-nourished in no acute distress.   SKIN:  Warm, dry without rashes, purpura or petechiae.  EYES:  Pupils equal, round and reactive to light.  EOMs intact.  Conjunctivae icteric  EARS:  Hearing intact.  NOSE:  Septum midline.  No excoriations or nasal discharge.  MOUTH:  Tongue is well-papillated; no stomatitis or ulcers.  Lips normal.  THROAT:  Oropharynx without lesions or  exudates.  NECK:  Supple with good range of motion; no thyromegaly or masses, no JVD.  LYMPHATICS:  No cervical, supraclavicular, axillary or inguinal adenopathy.  CHEST:  Lungs clear to auscultation. Good airflow.  CARDIAC:  Regular rate and rhythm without murmurs, rubs or gallops. Normal S1,S2.  ABDOMEN: Distended tender in the right upper quadrant unable to accurately examine him because of tenderness bowel sounds are present   EXTREMITIES:  No clubbing, cyanosis or edema.  NEUROLOGICAL:  Cranial Nerves II-XII grossly intact.  No focal neurological deficits.  PSYCHIATRIC:  Normal affect and mood.            RECENT LABS:  Hematology WBC   Date Value Ref Range Status   08/14/2019 8.38 3.40 - 10.80 10*3/mm3 Final     RBC   Date Value Ref Range Status   08/14/2019 5.84 (H) 4.14 - 5.80 10*6/mm3 Final     Hemoglobin   Date Value Ref Range Status   08/14/2019 14.1 13.0 - 17.7 g/dL Final     Hematocrit   Date Value Ref Range Status   08/14/2019 46.3 37.5 - 51.0 % Final     Platelets   Date Value Ref Range Status   08/14/2019 364 140 - 450 10*3/mm3 Final       AFP>60,500       Assessment/Plan     1.  Hepatocellular carcinoma multifocal in both lobes of the liver with intrahepatic obstruction causing hyperbilirubinemia and obstructive pattern  · Prognostic grouping as a child/Lemon class B liver disease  POOR PROGNOSIS  Patient wanting to go home we will give him oxycodone 30 mg every 6 follow-up in the office recommended a hospice consult  2.  Hepatitis B surface antigen positive  3.  Hepatitis C treated  4.  Substance abuse  5.  Coronary artery disease post MI 2014 with stent  6.  Diabetes mellitus     Plan  Await biopsy  I had a long discussion with the patient regarding the seriousness of the condition and the inability to cure this.  His elevated bilirubin and underlying liver disease will make it challenging to give him standard systemic therapy although lenvatinib at a lower dose may be option.  Intrahepatic therapy  not done with bilirubin over 2 - he is refusing further testing Both lab work and imaging and wants to go home    We will give oxycodone 30 mg every 6 with plans to see him in the office in 2 weeks                  8/16/2019      CC:

## 2019-08-16 NOTE — PROGRESS NOTES
"   LOS: 2 days   Patient Care Team:  German Monahan MD as PCP - General (Internal Medicine)    Chief Complaint: liver mass    Subjective     History of Present Illness  Certainly sad, in pain.   Subjective:  Symptoms:  Stable.    Diet:  Poor intake.    Activity level: Impaired due to pain.    Pain:  He complains of pain that is moderate.        History taken from: patient chart family RN    Objective     Vital Signs  Temp:  [96.9 °F (36.1 °C)-97.7 °F (36.5 °C)] 97.6 °F (36.4 °C)  Heart Rate:  [] 93  Resp:  [16-18] 16  BP: (142-158)/(87-91) 153/91    Objective:  General Appearance:  Ill-appearing.    Vital signs: (most recent): Blood pressure 153/91, pulse 93, temperature 97.6 °F (36.4 °C), temperature source Oral, resp. rate 16, height 175.3 cm (69\"), weight 113 kg (250 lb), SpO2 96 %.    Output: Producing urine.    Lungs:  Normal effort.    Heart: Normal rate.    Abdomen: There is epigastric tenderness. There is no rebound tenderness.  There is no guarding.   There is hepatomegaly.   Neurological: Patient is alert.              Results Review:     I reviewed the patient's new clinical results.  I reviewed the patient's new imaging results and agree with the interpretation.  I reviewed the patient's other test results and agree with the interpretation  Discussed with MARICARMEN DE LEON AND MINDI    Medication Review: DONE    Assessment/Plan       Acute hepatitis    Hepatitis C    Hepatitis B    Hypertension    Diabetes mellitus (CMS/HCC)      Assessment:  (Liver mass most likely hepatoma  Chronic active hepatitis B  Hepatic c positive rna level ending ).     Plan:   (Reviewed case with Dr Monzon pathology, and Dr De Leon oncology  Prognosis is grim  Agree with discharge and considering some form of therapy  Discussed with patient and parents at length. ).       Fabian Gabriel MD  08/16/19  11:27 AM    Time: Critical care 20 min      "

## 2019-08-17 NOTE — OUTREACH NOTE
Prep Survey      Responses   Facility patient discharged from?  [facility]   Is patient eligible?  Yes   Discharge diagnosis  Hepatocellular carcinoma   Does the patient have one of the following disease processes/diagnoses(primary or secondary)?  Other   Does the patient have Home health ordered?  No   Is there a DME ordered?  No   Prep survey completed?  Yes          Jyoit Real RN

## 2019-08-19 NOTE — OUTREACH NOTE
Medical Week 1 Survey      Responses   Facility patient discharged from?  [facility]   Does the patient have one of the following disease processes/diagnoses(primary or secondary)?  Other   Is there a successful TCM telephone encounter documented?  No   Week 1 attempt successful?  No   Unsuccessful attempts  Attempt 1          Peña Escobar RN

## 2019-08-19 NOTE — PAYOR COMM NOTE
"Kayley Live (54 y.o. Male)     ATTN: MICHELLE  REF#[id number]   D/C SUMMARY  THANKS!  SYBIL [phone] OR [phone]      Date of Birth Social Security Number Address Home Phone MRN    1965  [address] [place] [address] [phone] [id number]    [employer] Mar[employer] Status          None        Admission Date Admission Type Admitting Provider Attending Provider Department, Room/Bed    8/14/19 Emergency Jamie Dowling MD  [facility] [address] [place], P382/1    Discharge Date Discharge Disposition Discharge Destination        8/16/2019 Home or Self Care              Attending Provider:  (none)   Allergies:  Sulfa Antibiotics    Isolation:  None   Infection:  None   Code Status:  Prior    Ht:  175.3 cm (69\")   Wt:  113 kg (250 lb)    Admission Cmt:  None   Principal Problem:  Hepatocellular carcinoma (CMS/HCC) [C22.0] More...                 Active Insurance as of 8/14/2019     Primary Coverage     Payor Plan Insurance Group Employer/Plan Group    [employer] [place] [employer] [place]ICA[place]      Payor Plan Address Payor Plan Phone Number Payor Plan Fax Number Effective Dates    [address] [address] [phone]  8/14/2019 - None Entered    [facility] [id number]       Subscriber Name Subscriber Birth Date Member ID       KAYLEY LIVE 1965 [id number]                 Emergency Contacts      (Rel.) Home Phone Work Phone Mobile Phone    Lacey Live (Mother) [phone] -- --    Eliot Live (Father) [phone] -- --               Discharge Summary      Denzel Adrian MD at 8/16/2019 12:31 PM              Date of Admission: 8/14/2019  Date of Discharge:  8/16/2019  Primary Care Physician: German Monahan MD     Discharge Diagnosis:  Active Hospital Problems    Diagnosis  POA   • **Hepatocellular carcinoma (CMS/HCC) [C22.0]  Yes   • Acute hepatitis [B17.9]  Yes   • Hepatitis C [B19.20]  Yes   • Hepatitis B [B19.10]  Yes   • Hypertension [I10]  Yes   • " Diabetes mellitus (CMS/HCC) [E11.9]  Yes      Resolved Hospital Problems   No resolved problems to display.       Presenting Problem/History of Present Illness:  Acute hepatitis [B17.9]  Jaundice [R17]     Jose Live is a 54 y.o. male with a history of DM, hepatitis B and C, HLD, HTN and CAD s/p stenting. Patient presented with complaints of epigastric and RUQ abdominal pain that started 2 weeks ago and was associated with dark colored urine, yellow diarrhea, pruritis, N/V and jaundice. Patient states he was diagnosed with hepatitis B&C earlier this year and has received treatment per Dr. Patrice Tracey. He has has a prior MRI of abdomen and multiple CT after that and was recently found to have a possible lesion on the liver. Denies any personal history of cancer, but father had throat cancer. Patient denies any recent alcohol or Tylenol use, smoking or no heavy alcohol use in the past. Denies eating out recently.      In the ER he was found to have elevated LFT and bilirubin. CT of abdomen showed chronic liver disease suggestive of fibrotic steatosis vs infiltrative mass with trace perihepatic ascites and splenomegaly. There was also some bilary dilation of the right hepatic lobe.    Hospital Course:  The patient is a 54 y.o. male who presented with acute hepatitis in setting of hepatitis B and C and also found to have liver mass.  He was admitted and underwent consultation by gastroenterology.  Pain ultimately required PCA for control.  In addition of this he had topical lidocaine patches for back pain.  He underwent liver biopsy and pathology results are consistent with hepatocellular carcinoma multifocal in both lobes of liver and intrahepatic obstruction causing hyperbilirubinemia.  Prognostic grouping child/Lemon class B.  Has a poor prognosis.  Does not want further work-up as an inpatient.  Oncology is going to write for oral pain medication and he should follow-up with Dr. De Leon in oncology clinic in 2  weeks to discuss his clinical progression and also consideration of palliative care.  He can follow-up with Dr. Gabriel and gastroenterology clinic as needed.  No acute treatment of his viral hepatitis is planned and hepatitis C RNA was pending to confirm active infection since he had previously been treated for hepatitis C.    Exam Today:  Constitutional: He is oriented to person, place, and time. He appears well-developed. He appears distressed (pain).   HENT:   Head: Atraumatic.   Nose: Nose normal.   Eyes: Conjunctivae and EOM are normal.   Neck: Normal range of motion. Neck supple.   Cardiovascular: Normal rate, regular rhythm and intact distal pulses.   Pulmonary/Chest: Effort normal. He has decreased breath sounds. He has no wheezes.   Abdominal: Soft. He exhibits distension. There is tenderness (RUQ). There is guarding. There is no rebound.   Musculoskeletal: He exhibits tenderness (lower back bilaterally). He exhibits no edema.   Neurological: He is alert and oriented to person, place, and time.   Skin: Skin is warm and dry. He is not diaphoretic.   Psychiatric: He has a normal mood and affect. His behavior is normal.     Results:  Tissue Pathology Exam: [id number]   Order: [id number]   Collected:  8/15/2019 10:22 Status:  Preliminary result   Visible to patient:  No (Not Released)   Component    Clinical Information P   Suspected hepatoma   Final Diagnosis   1.  Liver Biopsy (H&E, iron and trichrome):                A.  Poorly differentiated nonsmall cell carcinoma suspicious for lymphatic space invasion                      (see comment).                B.  Bridging Fibrosis with focal moderate macrovesicular steatosis.           CT Abdomen/Pelvis  Findings suggestive of chronic liver disease/cirrhosis with  trace perihepatic ascites and splenomegaly. Heterogenous attenuation of  the liver which may be related to fibrosis steatosis, however  infiltrative mass needs to be excluded by MR imaging with and  without  contrast. Correlation with alpha-fetoprotein is recommended. There is  segmental biliary dilatation within the right hepatic lobe without  evidence of obstruction and this can be evaluated on the MR as well.     These findings were discussed with Dr. Weiss by telephone who  informed me that patient has had prior imaging of the liver at [facility]. Correlation with prior imaging and follow-up is recommended.    CT Chest  1. No focal pulmonary consolidation or pulmonary mass.  2. Multiple subcentimeter short axis paraesophageal lymph nodes are  Nonspecific.    MRCP  Moderate hepatomegaly. Extensive heterogeneous and nodular  abnormal signal intensity and enhancement throughout the liver with  intrahepatic bile duct obstruction region of the jone hepatis.  Extensive hepatic metastatic disease, most likely hepatocellular  carcinoma in light of the patient's markedly elevated alpha-fetoprotein  is suspected. Further evaluation with biopsy is recommended.    Procedures Performed:         Consults:   Consults     Date and Time Order Name Status Description    8/14/2019 1730 Hematology & Oncology Inpatient Consult Completed     8/14/2019 1245 Inpatient Gastroenterology Consult Completed     8/14/2019 1004 [facility] (on-call MD unless specified) Details Completed            Discharge Disposition:  Home or Self Care    Discharge Medications:     Discharge Medications      New Medications      Instructions Start Date   oxyCODONE 15 MG immediate release tablet  Commonly known as:  ROXICODONE   30 mg, Oral, Every 6 Hours PRN         Continue These Medications      Instructions Start Date   lisinopril 20 MG tablet  Commonly known as:  PRINIVIL,ZESTRIL   20 mg, Oral, Daily      TRESIBA SC   Subcutaneous             Discharge Diet:   Diet Instructions     Advance Diet As Tolerated            Activity at Discharge:   Activity Instructions     Activity as Tolerated            Follow-up Appointments:  Follow-up Information      German Monahan MD Follow up.    Specialty:  Internal Medicine  Contact information:  [address]  [address]  [facility] [id number] [phone]             Janny De Leon MD Follow up in 2 week(s).    Specialties:  Hematology and Oncology, Oncology, Hematology  Contact information:  [address]  [facility] [address]  [facility] [id number] [phone]             Fabian Gabriel MD Follow up.    Specialty:  Gastroenterology  Why:  as needed  Contact information:  [address] [facility]  [address]  [facility] [id number] [phone]                   Test Results Pending at Discharge:   Order Current Status    Hepatitis C RNA, Quantitative, PCR (graph) In process    Tissue Pathology Exam Preliminary result           Denzel Adrian MD  08/16/19  12:31 PM    Time Spent on Discharge Activities: >30 minutes    Electronically signed by eDnzel Adrian MD at 8/16/2019 12:39 PM

## 2019-08-20 NOTE — OUTREACH NOTE
Medical Week 1 Survey      Responses   Facility patient discharged from?  [facility]   Does the patient have one of the following disease processes/diagnoses(primary or secondary)?  Other   Is there a successful TCM telephone encounter documented?  No   Week 1 attempt successful?  No   Unsuccessful attempts  Attempt 2          Evelia Stapleton RN

## 2019-08-25 NOTE — OUTREACH NOTE
Medical Week 2 Survey      Responses   Facility patient discharged from?  [facility]   Does the patient have one of the following disease processes/diagnoses(primary or secondary)?  Other   Week 2 attempt successful?  Yes   Call start time  1718   Discharge diagnosis  Hepatocellular carcinoma   Call end time  1724   Person spoke with today (if not patient) and relationship  [place]   Meds reviewed with patient/caregiver?  Yes   Is the patient having any side effects they believe may be caused by any medication additions or changes?  No   Does the patient have all medications ordered at discharge?  Yes   Is the patient taking all medications as directed (includes completed medication regime)?  Yes   Does the patient have a primary care provider?   Yes   Does the patient have an appointment with their PCP within 7 days of discharge?  N/A   Comments regarding PCP  Pt's PCP is Dr. Monahan. Pt has not seen anyone since he's been home.    Has the patient kept scheduled appointments due by today?  N/A [Pt has had no appointments since his hospital D/C]   Psychosocial issues?  No   What is the patient's perception of their health status since discharge?  Worsening [Mom reports he's not eating and he's hardly able to get up on his own. Advised her call PCP first thing in the morning and if that's too long take patient to the ER tonight. ]   Is the patient/caregiver able to teach back the hierarchy of who to call/visit for symptoms/problems? PCP, Specialist, Home health nurse, Urgent Care, ED, 911  Yes   Week 2 Call Completed?  Yes          Nicole Phillips RN

## 2019-08-28 ENCOUNTER — READMISSION MANAGEMENT (OUTPATIENT)
Dept: CALL CENTER | Facility: HOSPITAL | Age: 54
End: 2019-08-28

## 2019-08-28 NOTE — OUTREACH NOTE
Medical Week 3 Survey      Responses   Facility patient discharged from?  [facility]   Does the patient have one of the following disease processes/diagnoses(primary or secondary)?  Other   Week 3 attempt successful?  No   Revoke  Patient           Mellisa Brown RN

## 2019-08-30 ENCOUNTER — APPOINTMENT (OUTPATIENT)
Dept: ONCOLOGY | Facility: CLINIC | Age: 54
End: 2019-08-30

## 2019-08-30 ENCOUNTER — APPOINTMENT (OUTPATIENT)
Dept: LAB | Facility: HOSPITAL | Age: 54
End: 2019-08-30